# Patient Record
Sex: FEMALE
[De-identification: names, ages, dates, MRNs, and addresses within clinical notes are randomized per-mention and may not be internally consistent; named-entity substitution may affect disease eponyms.]

---

## 2017-05-24 ENCOUNTER — HOSPITAL ENCOUNTER (OUTPATIENT)
Dept: HOSPITAL 53 - M LAB REF | Age: 77
End: 2017-05-24
Attending: NURSE PRACTITIONER
Payer: MEDICARE

## 2017-05-24 DIAGNOSIS — R32: Primary | ICD-10-CM

## 2017-06-20 ENCOUNTER — HOSPITAL ENCOUNTER (OUTPATIENT)
Dept: HOSPITAL 53 - M LAB REF | Age: 77
End: 2017-06-20
Attending: NURSE PRACTITIONER
Payer: MEDICARE

## 2017-06-20 DIAGNOSIS — R35.0: Primary | ICD-10-CM

## 2017-08-04 ENCOUNTER — HOSPITAL ENCOUNTER (OUTPATIENT)
Dept: HOSPITAL 53 - M LAB REF | Age: 77
End: 2017-08-04
Attending: NURSE PRACTITIONER
Payer: MEDICARE

## 2017-08-04 DIAGNOSIS — M25.569: Primary | ICD-10-CM

## 2020-03-30 ENCOUNTER — HOSPITAL ENCOUNTER (OUTPATIENT)
Dept: HOSPITAL 53 - M WUC | Age: 80
End: 2020-03-30
Attending: NURSE PRACTITIONER
Payer: MEDICARE
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
## 2020-03-30 DIAGNOSIS — S40.861A: Primary | ICD-10-CM

## 2020-04-02 LAB
B BURGDOR IGG+IGM SER-ACNC: <0.91 ISR (ref 0–0.9)
B BURGDOR IGM SER IA-ACNC: <0.8 INDEX (ref 0–0.79)

## 2020-12-14 ENCOUNTER — HOSPITAL ENCOUNTER (OUTPATIENT)
Dept: HOSPITAL 53 - M ED | Age: 80
Setting detail: OBSERVATION
LOS: 2 days | Discharge: HOME | End: 2020-12-16
Attending: INTERNAL MEDICINE | Admitting: FAMILY MEDICINE
Payer: MEDICARE

## 2020-12-14 VITALS — WEIGHT: 127.21 LBS | BODY MASS INDEX: 25.64 KG/M2 | HEIGHT: 59 IN

## 2020-12-14 DIAGNOSIS — I10: ICD-10-CM

## 2020-12-14 DIAGNOSIS — I16.0: Primary | ICD-10-CM

## 2020-12-14 DIAGNOSIS — I65.22: ICD-10-CM

## 2020-12-14 DIAGNOSIS — Z79.899: ICD-10-CM

## 2020-12-14 DIAGNOSIS — Z79.82: ICD-10-CM

## 2020-12-14 DIAGNOSIS — Z88.8: ICD-10-CM

## 2020-12-14 DIAGNOSIS — Z79.01: ICD-10-CM

## 2020-12-14 DIAGNOSIS — E78.49: ICD-10-CM

## 2020-12-14 DIAGNOSIS — Z87.891: ICD-10-CM

## 2020-12-14 DIAGNOSIS — Z91.013: ICD-10-CM

## 2020-12-14 PROCEDURE — 85730 THROMBOPLASTIN TIME PARTIAL: CPT

## 2020-12-14 PROCEDURE — 93005 ELECTROCARDIOGRAM TRACING: CPT

## 2020-12-14 PROCEDURE — 71045 X-RAY EXAM CHEST 1 VIEW: CPT

## 2020-12-14 PROCEDURE — 99285 EMERGENCY DEPT VISIT HI MDM: CPT

## 2020-12-14 PROCEDURE — 85027 COMPLETE CBC AUTOMATED: CPT

## 2020-12-14 PROCEDURE — 97165 OT EVAL LOW COMPLEX 30 MIN: CPT

## 2020-12-14 PROCEDURE — 86901 BLOOD TYPING SEROLOGIC RH(D): CPT

## 2020-12-14 PROCEDURE — 70450 CT HEAD/BRAIN W/O DYE: CPT

## 2020-12-14 PROCEDURE — 93880 EXTRACRANIAL BILAT STUDY: CPT

## 2020-12-14 PROCEDURE — 96374 THER/PROPH/DIAG INJ IV PUSH: CPT

## 2020-12-14 PROCEDURE — 80048 BASIC METABOLIC PNL TOTAL CA: CPT

## 2020-12-14 PROCEDURE — 85610 PROTHROMBIN TIME: CPT

## 2020-12-14 PROCEDURE — 97161 PT EVAL LOW COMPLEX 20 MIN: CPT

## 2020-12-14 PROCEDURE — 70551 MRI BRAIN STEM W/O DYE: CPT

## 2020-12-14 PROCEDURE — 84484 ASSAY OF TROPONIN QUANT: CPT

## 2020-12-14 PROCEDURE — 93306 TTE W/DOPPLER COMPLETE: CPT

## 2020-12-14 PROCEDURE — 93041 RHYTHM ECG TRACING: CPT

## 2020-12-14 PROCEDURE — 80061 LIPID PANEL: CPT

## 2020-12-14 PROCEDURE — 94760 N-INVAS EAR/PLS OXIMETRY 1: CPT

## 2020-12-14 PROCEDURE — 86850 RBC ANTIBODY SCREEN: CPT

## 2020-12-14 PROCEDURE — 82550 ASSAY OF CK (CPK): CPT

## 2020-12-14 PROCEDURE — 70544 MR ANGIOGRAPHY HEAD W/O DYE: CPT

## 2020-12-14 PROCEDURE — 36415 COLL VENOUS BLD VENIPUNCTURE: CPT

## 2020-12-14 PROCEDURE — 86900 BLOOD TYPING SEROLOGIC ABO: CPT

## 2020-12-14 PROCEDURE — 82553 CREATINE MB FRACTION: CPT

## 2020-12-14 PROCEDURE — 85025 COMPLETE CBC W/AUTO DIFF WBC: CPT

## 2020-12-14 PROCEDURE — 87631 RESP VIRUS 3-5 TARGETS: CPT

## 2020-12-14 PROCEDURE — 97116 GAIT TRAINING THERAPY: CPT

## 2020-12-15 VITALS — SYSTOLIC BLOOD PRESSURE: 140 MMHG | DIASTOLIC BLOOD PRESSURE: 80 MMHG

## 2020-12-15 VITALS — DIASTOLIC BLOOD PRESSURE: 70 MMHG | SYSTOLIC BLOOD PRESSURE: 157 MMHG

## 2020-12-15 VITALS — DIASTOLIC BLOOD PRESSURE: 75 MMHG | SYSTOLIC BLOOD PRESSURE: 162 MMHG

## 2020-12-15 VITALS — SYSTOLIC BLOOD PRESSURE: 155 MMHG | DIASTOLIC BLOOD PRESSURE: 69 MMHG

## 2020-12-15 LAB
APTT BLD: 29.2 SECONDS (ref 24.2–38.5)
BASOPHILS # BLD AUTO: 0.1 10^3/UL (ref 0–0.2)
BASOPHILS NFR BLD AUTO: 0.9 % (ref 0–1)
BUN SERPL-MCNC: 26 MG/DL (ref 7–18)
CALCIUM SERPL-MCNC: 9.3 MG/DL (ref 8.8–10.2)
CHLORIDE SERPL-SCNC: 108 MEQ/L (ref 98–107)
CHOLEST SERPL-MCNC: 238 MG/DL (ref ?–200)
CHOLEST/HDLC SERPL: 3.84 {RATIO} (ref ?–5)
CK MB CFR.DF SERPL CALC: 3.77
CK MB SERPL-MCNC: 2.3 NG/ML (ref ?–3.6)
CK SERPL-CCNC: 61 U/L (ref 26–192)
CO2 SERPL-SCNC: 29 MEQ/L (ref 21–32)
CREAT SERPL-MCNC: 1.2 MG/DL (ref 0.55–1.3)
EOSINOPHIL # BLD AUTO: 0.6 10^3/UL (ref 0–0.5)
EOSINOPHIL NFR BLD AUTO: 6.6 % (ref 0–3)
GFR SERPL CREATININE-BSD FRML MDRD: 45.9 ML/MIN/{1.73_M2} (ref 32–?)
GLUCOSE SERPL-MCNC: 130 MG/DL (ref 70–100)
HCT VFR BLD AUTO: 35.6 % (ref 36–47)
HDLC SERPL-MCNC: 62 MG/DL (ref 40–?)
HGB BLD-MCNC: 11.1 G/DL (ref 12–15.5)
INR PPP: 0.92
LDLC SERPL CALC-MCNC: 143 MG/DL (ref ?–100)
LYMPHOCYTES # BLD AUTO: 2.4 10^3/UL (ref 1.5–5)
LYMPHOCYTES NFR BLD AUTO: 28.3 % (ref 24–44)
MCH RBC QN AUTO: 29.2 PG (ref 27–33)
MCHC RBC AUTO-ENTMCNC: 31.2 G/DL (ref 32–36.5)
MCV RBC AUTO: 93.7 FL (ref 80–96)
MONOCYTES # BLD AUTO: 0.7 10^3/UL (ref 0–0.8)
MONOCYTES NFR BLD AUTO: 8.6 % (ref 0–5)
NEUTROPHILS # BLD AUTO: 4.7 10^3/UL (ref 1.5–8.5)
NEUTROPHILS NFR BLD AUTO: 55.4 % (ref 36–66)
NONHDLC SERPL-MCNC: 176 MG/DL
PLATELET # BLD AUTO: 334 10^3/UL (ref 150–450)
POTASSIUM SERPL-SCNC: 4.2 MEQ/L (ref 3.5–5.1)
PROTHROMBIN TIME: 12.6 SECONDS (ref 12.5–14.3)
RBC # BLD AUTO: 3.8 10^6/UL (ref 4–5.4)
RSV RNA NPH QL NAA+PROBE: NEGATIVE
SODIUM SERPL-SCNC: 141 MEQ/L (ref 136–145)
TRIGL SERPL-MCNC: 165 MG/DL (ref ?–150)
TROPONIN I SERPL-MCNC: < 0.02 NG/ML (ref ?–0.1)
WBC # BLD AUTO: 8.5 10^3/UL (ref 4–10)

## 2020-12-15 RX ADMIN — ATORVASTATIN CALCIUM SCH MG: 20 TABLET, FILM COATED ORAL at 04:30

## 2020-12-15 RX ADMIN — Medication SCH MG: at 09:00

## 2020-12-15 RX ADMIN — ATORVASTATIN CALCIUM SCH MG: 20 TABLET, FILM COATED ORAL at 20:45

## 2020-12-15 RX ADMIN — CLOPIDOGREL BISULFATE SCH MG: 75 TABLET ORAL at 09:26

## 2020-12-15 NOTE — REPVR
PROCEDURE INFORMATION: 

Exam: MR Head Without Contrast 

Exam date and time: 12/15/2020 3:38 PM 

Age: 80 years old 

Clinical indication: Altered mental status/memory loss; Confusion or 

disorientation; Patient HX: HX CVA , AMS that has since subsided; Additional 

info: TIA, HX of CVA 



TECHNIQUE: 

Imaging protocol: MR of the head without contrast. 



COMPARISON: 

CT Head without contrast 12/14/2020 11:37 PM 



FINDINGS: 

Brain: There are 2 foci of restricted diffusion, the 1st is in the body of the 

right caudate series 304, image 1 frame 22 and the 2nd is in the lateral body 

of the caudate/centrum semiovale series 304, image 1 frame 23. Increased signal 

intensity of the deep and subcortical white matter on the FLAIR and T2 weighted 

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 

infarctions in the basal ganglia largest in the thalamus on the right and in 

the body of the left caudate. No acute intracranial hemorrhage or prior 

microhemorrhages. There is a normal variant cavum septum pellucidum et vergae. 

Cerebral ventricles: The ventricles appear mildly enlarged, but not out of 

proportion to the degree of parenchymal volume loss.

Bones/joints: Unremarkable. 

Paranasal sinuses: No acute sinusitis. 

Mastoid air cells: No mastoid effusion. 

Orbits: Unremarkable. 

Soft tissues: Unremarkable. 



IMPRESSION: 

There are 2 foci of acute infarction within the right caudate nucleus.



Electronically signed by: Keisha Melendez On 12/15/2020  16:08:08 PM

## 2020-12-15 NOTE — HPEPDOC
General


Date of Admission





Date of Service:  Dec 15, 2020


Primary Care Physician:  MICK KEENE DO


Attending Physician:  Jordy Martell MD


Chief Complaint


The patient is a 80-year-old female admitted with a reason for visit of "my 

speech was very slurred."


Source:  Patient, RN/MD


Exam Limitations:  No limitations





History of Present Illness


Ms. Gutiérrez was at home with her family having supper when suddenly she had 

difficulty speaking and couldn't lift her left arm. The symptoms lasted only 2 

or 3 minutes and initially the patient and her family were sure to do. They 

waited a little while to bring her in for evaluation, but eventually decided 

evaluation in the hospital even though her symptoms had resolved.





For now she is under a lot of stress right now. Her   two evenings 

ago while under the care of Hospice.





Home Medications


Scheduled


Amlodipine Besylate (Amlodipine Besylate) 5 Mg Tablet, 5 MG PO DAILY, (Reported)


Bimatoprost (Lumigan) 0.01% 2.5ML Drops, 1 DROP OU QHS, (Reported)


Calcium Carbonate/Vitamin D3 (Calcium 500-Vit D3 200 Caplet) 1 Each Tablet, 1 

TAB PO DAILY, (Reported)


Cholecalciferol (Vitamin D3) (Vitamin D3) 50 Mcg Tablet, 50 MCG PO DAILY, 

(Reported)


Vitamin B Complex (B Complex) 1 Each Tablet, 1 TAB PO DAILY, (Reported)





Allergies


Coded Allergies:  


     hydrochlorothiazide (Verified  Allergy, Intermediate, 20)


   


   RASH


     shellfish derived (Verified  Allergy, Intermediate, 20)


   


   RASH


Uncoded Allergies:  


     BONE FISH (Allergy, Intermediate, 20)


   


   RASH





Past Medical History


Medical History


Hypertension


Hyperlipidemia


Osteoarthritis


Macular degeneration of the right eye


Surgical History


Left total hip arthroplasty


Right total knee arthroplasty





Family History


Her father  at the age of 78 of prostate cancer


Her mother  at the age of 90 of Alzheimer's disease and osteoporosis





Social History


* Smoker:  former Smoker (quit smoking in the 1970s)


Alcohol:  other (she reports she drinks 2-3 beers each night)


Drugs:  denies (but she does have CBD oil in her coffee most every day)


She lives in a separate house on her son's property





A-FIB/CHADSVASC


A-FIB History


Current/History of A-Fib/PAF?:  No


Current PO Anticoag Therapy:  No





Review of Systems


Constitutional:  Denies: Chills, Fever


Eyes:  Denies: Vision change


ENT:  Denies: Head Aches, Dysphagia


Pulmonary:  Denies: Dyspnea, Cough


Cardiovascular:  Denies: Chest Pain, Palpitations, Lt Headedness


Gastrointestinal:  Denies: Nausea, Vomiting, Abdominal Pain


Genitourinary:  Denies: Dysuria, Hematuria


Hematologic:  Denies: Bruising, Bleeding Excessively


Endocrine:  Denies: Heat Intolerance, Cold Intolerance


Neurological:  Reports: Weakness, Change in speech; 


   Denies: Numbness, Confusion, Seizures


Psych:  Reports: Mood Normal, Depression; 


   Denies: Memory Issues





Physical Examination


General Exam:  Positive: Alert, Cooperative, No Acute Distress


Eye Exam:  Positive: PERRLA, Conjunctiva & lids normal; 


   Negative: Sclera icteric


ENT Exam:  Positive: Atraumatic, Mucous membr. moist/pink, Pharynx Normal, 

Tongue Midline


Neck Exam:  Positive: Supple; 


   Negative: Lymphadenopathy


Chest Exam:  Positive: Clear to auscultation, Normal air movement


Heart Exam:  Positive: Rate Normal, Regular Rhythm, Normal S1, Normal S2, 

Murmurs (there was a mid systolic murmur noted at the left lower sternal 

border); 


   Negative: Rubs


Telemetry:  Positive: No significant arrhythmia


Abdomen Exam:  Positive: Normal bowel sounds, Soft; 


   Negative: Tenderness, Hepatospenomegaly


Extremity Exam:  Positive: Normal pulses; 


   Negative: Edema


Skin Exam:  Positive: Nl turgor and temperature; 


   Negative: Rash


Neuro Exam:  Positive: Normal Speech, Strength at 5/5 X4 ext, Normal Tone, 

Sensation Intact, Cranial Nerves 3-12 NL


Psych Exam:  Positive: Mental status NL, Mood NL, Oriented x 3





Vital Signs





Vital Signs








  Date Time  Temp Pulse Resp B/P (MAP) Pulse Ox O2 Delivery O2 Flow Rate FiO2


 


12/15/20 03:45  65 18 179/84 (115) 96 Room Air  


 


20 23:24 97.8       











Laboratory Data


Labs 24H


Laboratory Tests 2


20 23:52: Bedside Glucose (Misc Panel) 127H


12/15/20 00:08: 


Immature Granulocyte % (Auto) 0.2, Neutrophils (%) (Auto) 55.4, Lymphocytes (%) 

(Auto) 28.3, Monocytes (%) (Auto) 8.6H, Eosinophils (%) (Auto) 6.6H, Basophils 

(%) (Auto) 0.9, Neutrophils # (Auto) 4.7, Lymphocytes # (Auto) 2.4, Monocytes # 

(Auto) 0.7, Eosinophils # (Auto) 0.6H, Basophils # (Auto) 0.1, Nucleated Red 

Blood Cells % (auto) 0.0, Prothrombin Time 12.6, Prothromb Time International 

Ratio 0.92, Activated Partial Thromboplast Time 29.2, Anion Gap 4L, Glomerular 

Filtration Rate 45.9, Calcium Level 9.3, Total Creatine Kinase 61, Creatine 

Kinase MB 2.3, Creatine Kinase MB Relative Index 3.77, Troponin I < 0.02


12/15/20 00:17: 


Coronavirus (COVID-19)(PCR) NEGATIVE, Influenza Type A (RT-PCR) NEGATIVE, 

Influenza Type B (RT-PCR) NEGATIVE, Respiratory Syncytial Virus (PCR) NEGATIVE


CBC/BMP


Laboratory Tests


12/15/20 00:08











Problems





(1) TIA (transient ischemic attack)


Status:  Resolved


Problem Specific Plan:  Monitor Clinically


Problem Text:  Her symptoms had entirely resolved before she came to the 

hospital. Her CT scan does show chronic lacunar infarcts. In this setting, I do 

think she merits further evaluation in the acute care setting. Will monitor on 

telemetry, do neuro checks, get an echo (especially because she has a possibly 

new murmur), and get carotid Dopplers (in light of the lacunar infarcts). Did 

start her on Plavix, and atorvastatin.





(2) Hypertension


Status:  Chronic


Problem Specific Plan:  Monitor Clinically


Problem Text:  Her blood pressure is running high while in the emergency 

department. The systolic blood pressure was fairly consistently in the 180s. 

This might represent compensatory hypertension if there is a small area of her 

brain that is ischemic and needs additional pressure so as not to worsen. 

However, I do not want to leave her blood pressure to uncontrolled either. I 

gave her a one-time dose of labetalol and will monitor. I did continue her 

routine medications.





(3) Hyperlipidemia


Problem Text:  I started her on atorvastatin based on her presentation with a 

TIA. I ordered labs for the morning. 








Plan / VTE


VTE Prophylaxis Ordered?:  Yes





Plan


Advanced Directives:  MOLST Form is available (I filled a most form out with her

in the emergency department today.), Health Care Proxy (HCP)











Jordy Martell MD             Dec 15, 2020 04:21

## 2020-12-15 NOTE — REPVR
PROCEDURE INFORMATION: 

Exam: US Duplex Bilateral Extracranial Arteries 

Exam date and time: 12/15/2020 5:01 AM 

Age: 80 years old 

Clinical indication: Speech disturbance; Slurred speech; Additional info: TIA, 

lacunar infarcts 



TECHNIQUE: 

Imaging protocol: Real-time Duplex ultrasound scan of the bilateral carotid and 

vertebral arteries combining gray scale, color Doppler and spectral waveform 

analysis. Bilateral exam. 



COMPARISON: 

CT Head without contrast 12/14/2020 11:37 PM 



FINDINGS: 

Right common carotid artery: Mild intimal thickening with mild mural soft and 

hard plaques seen in the right CCA extending to the carotid bulb and proximal 

ICA. Normal waveform seen in the right CCA with peak systolic velocities of 

80.1 centimeter/second, 65.2 centimeter/second and 52.8 centimeter/second in 

the proximal, mid and distal CCA respectively. 

Right internal carotid artery: Normal waveform seen in the right carotid bulb 

with peak systolic velocity of 56.5 centimeter/second. Normal waveform seen in 

the right ICA with peak systolic velocities of 71.1 centimeter/second, 59.4 

centimeter/second, and 67.8 centimeter/second in the proximal, mid and distal 

ICA respectively. 

Right ICA/CCA ratio: Right ICA to CCA ratio is within normal limits at 0.89. 

Right external carotid artery: Normal waveform with peak systolic velocity of 

53.5 centimeter/second seen in the proximal right ECA. 

Right vertebral artery: Antegrade flow seen in the right vertebral artery with 

peak systolic velocity of 52.8 centimeter/second. 



Left common carotid artery: Diffuse intimal thickening seen throughout the left 

CCA with peak systolic velocities 55.3 centimeter/second, 60.3 

centimeter/second and 59.6 centimeter/second in the proximal, mid and distal 

CCA respectively. 

Left internal carotid artery: Bulky calcified plaques seen in the left carotid 

bulb extending to the ICA. Peak systolic velocity of 67.7 centimeter/second 

seen in the left carotid bulb. There is turbulent flow with increased 

velocities in the proximal left ICA with peak systolic velocities 755 

centimeter/second and end-diastolic velocity of 423 centimeter/second. Peak 

systolic velocity of 38.2 centimeter/second seen in the mid left ICA. Peak 

systolic velocity of 38.5 centimeter/second seen in the distal left ICA. 

Left ICA/CCA ratio: Left ICA to CCA ratio is significantly increased at 12.52. 

Left external carotid artery: Peak systolic velocity of 83.8 centimeter/second 

seen in the proximal left ECA. 

Left vertebral artery: Antegrade flow seen in the left vertebral artery with 

peak systolic velocity of 51.4 centimeter/second. 



IMPRESSION: 

Bulky calcified plaques in the left carotid bulb extending to the proximal left 

ICA resulting in more than 70% (but less than near occlusion) stenosis. 



REFERENCES: 

SRU CRITERIA. The degree of internal carotid artery stenosis is based on 

criteria defined by the Society of Radiologists in Ultrasound (SRU). Normal is 

no stenosis. Mild is less than 50% stenosis. Moderate is 50-69% stenosis. 

Severe is greater than 69% stenosis to near occlusion. Near occlusion is a 

markedly narrowed lumen. Total occlusion is no detectable patent lumen. 



Electronically signed by: Alban Porter On 12/15/2020  05:22:55 AM

## 2020-12-15 NOTE — REPVR
PROCEDURE INFORMATION: 

Exam: MR Angiogram Head Without Contrast, Arteries 

Exam date and time: 12/15/2020 3:38 PM 

Age: 80 years old 

Clinical indication: Cognitive deficit; Altered mental status; Patient HX: HX 

CVA , AMS that has since subsided; Additional info: TIA, HX of CVA 



TECHNIQUE: 

Imaging protocol: MR angiogram head without contrast. Exam focused on the 

arteries. 

3D rendering (Not supervised by radiologist): MIP and/or 3D reconstructed 

images were created by the technologist. 



COMPARISON: 

CT Head without contrast 12/14/2020 11:37 PM 



FINDINGS: 



ANTERIOR CIRCULATION: 

Right internal carotid artery: Intracranial segment is patent with no 

significant stenosis. No aneurysm. 

Right middle cerebral artery: There is focal stenosis in the mid to distal 

right M1 segment measuring approximately 3.5 mm in length. Series 203, image 1 

frames 9 -12. 

Right anterior cerebral artery: No occlusion or significant stenosis. No 

aneurysm. 



Left internal carotid artery: Intracranial segment is patent with no 

significant stenosis. No aneurysm. 

Left middle cerebral artery: No occlusion or significant stenosis. No aneurysm. 

Left anterior cerebral artery: Very little flow is seen in the left A1 segment 

which may be due to congenital hypoplasia versus stenosis. 



POSTERIOR CIRCULATION: 

Right vertebral artery: No occlusion or significant stenosis. No aneurysm. 

Left vertebral artery: No occlusion or significant stenosis. No aneurysm. 

Basilar artery: No occlusion or significant stenosis. No aneurysm. 

Right posterior cerebral artery: There may be focal areas of stenosis at the 

junction of the right P2 and P3 segments as well as the right P3 and P4 

segments of the right posterior cerebral artery. 

Left posterior cerebral artery: No occlusion or significant stenosis. No 

aneurysm. 



IMPRESSION: 

1. No definite acute occlusion although there is stenosis versus hypoplasia of 

the left A1 segment.

2. Focal stenoses are seen in the right posterior cerebral artery as above.  



Electronically signed by: Keisha Melendez On 12/15/2020  15:56:58 PM

## 2020-12-15 NOTE — REPVR
PROCEDURE INFORMATION: 

Exam: XR Chest, 1 View 

Exam date and time: 12/14/2020 11:50 PM 

Age: 80 years old 

Clinical indication: CVA 



TECHNIQUE: 

Imaging protocol: XR of the chest 

Views: 1 view. 



COMPARISON: 

No relevant prior studies available. 



FINDINGS: 

Lungs: Unremarkable. No consolidation. No pulmonary edema. 

Pleural space: Unremarkable. No pleural effusion or pneumothorax is identified. 

Heart/Mediastinum: Unremarkable. No cardiomegaly. 

Vasculature: There are atherosclerotic calcifications of the aortic arch. 

Bones/joints: There are endplate spurs in the thoracic spine. There is a mild 

levoscoliosis of the lumbar spine. 



IMPRESSION: 

No acute findings. 



Electronically signed by: Kareem Logan On 12/15/2020  01:38:49 AM

## 2020-12-15 NOTE — ECGEPIP
[facility] - ED

                                       

                                       Test Date:    2020-12-15

Pat Name:     LIZETTE CROW         Department:   

Patient ID:   [id number]                 Room:         [place] [address]

Gender:       Female                   Technician:   HELENA

:          1940               Requested By: SANIA BANERJEE

Order Number: [id number]     Reading MD:   Larry Borrego

                                 Measurements

Intervals                              Axis          

Rate:         85                       P:            40

VT:           154                      QRS:          -15

QRSD:         99                       T:            37

QT:           393                                    

QTc:          469                                    

                           Interpretive Statements

SINUS RHYTHM WITH FREQUENT SUPRAVENTRICULAR PREMATURE COMPLEXES

INCOMPLETE RIGHT BUNDLE BRANCH BLOCK

MINIMAL VOLTAGE CRITERIA FOR LVH, CONSIDER NORMAL VARIANT

NO PRIORS FOR COMPARISON

Electronically Signed on 12- 7:51:29 EST by Larry Borrego

## 2020-12-15 NOTE — REPVR
PROCEDURE INFORMATION: 

Exam: CT Head Without Contrast 

Exam date and time: 12/14/2020 11:33 PM 

Age: 80 years old 

Clinical indication: CVA 



TECHNIQUE: 

Imaging protocol: Computed tomography of the head without contrast. 

Radiation optimization: All CT scans at this facility use at least one of these 

dose optimization techniques: automated exposure control; mA and/or kV 

adjustment per patient size (includes targeted exams where dose is matched to 

clinical indication); or iterative reconstruction. 



COMPARISON: 

No relevant prior studies available. 



FINDINGS: 

Brain: There is no CT evidence for an acute large vessel territorial infarct. 

No acute intracranial hemorrhage is seen. There are non-specific foci of low 

attenuation in the periventricular and subcortical white matter, which are 

likely the sequela of chronic small vessel ischemic injury. There are chronic 

lacunar infarcts involving the head of the right caudate nucleus and body of 

the left caudate nucleus. No mass effect, midline shift, or herniation is 

noted. 

Cerebral ventricles: The ventricles are mildly dilated in proportion to the 

sulci, which is compatible with mild generalized cerebral volume loss. 

Incidental note is made of a normal variant cavum septum pellucidum and cavum 

vergae, which is persistence of the embryological fluid-filled space between 

the leaflets of the septum pellucidum. 

Bones/joints: The skull is intact. No suspicious osteolytic or osteoblastic 

lesion. 

Paranasal sinuses: The imaged portions of the sinuses are well-aerated. No 

air-fluid levels are noted in the sinuses. 

Mastoid air cells: Clear. 

Vasculature: There are atherosclerotic calcifications of the intracranial 

portion of the left vertebral artery and both internal carotid arteries. 

Soft tissues: Unremarkable. No soft tissue fluid collection. 



IMPRESSION: 

1. No acute intracranial abnormality. 

2. Chronic lacunar infarcts involving the head of the right caudate nucleus and 

body of the left caudate nucleus. 

3. Mild cerebral atrophy and chronic microangiopathic changes. 



Electronically signed by: Kareem Logan On 12/15/2020  00:10:16 AM

## 2020-12-15 NOTE — IPNPDOC
Date Seen


The patient was seen on 12/15/20.





Progress Note


SUBJECTIVE:


No focal deficits on exam this AM. MRI/MRA brain done today- 2 foci of acute 

infarction noted within right caudate nucleus. Started on ASA in addition to 

existing therapy. PT/OT, echo to be done t





OBJECTIVE: 





VITAL SIGNS: 


Please see below





PHYSICAL EXAMINATION: 


CONSTITUTIONAL: No acute distress, resting comfortably, AAO x 3


EYES: PERRLA, EOM intact


HENT, MOUTH: Normocephalic, atraumatic, moist mucous membranes


NECK: SUPPLE, no JVD, no lymphadenopathy, no carotid bruit


CV: Regular rate and rhythm, S1S2 normal, no murmurs/rubs/gallops


RESPIRATORY: Clear to auscultation bilaterally, no rales/rhonchi/wheezes


GI:  BS positive in 4 quadrants, soft, nontender, nondistended, no rebound or 

guarding, no organomegaly


: Deferred


MUSCULOSKELETAL: Normal ROM. No cyanosis, clubbing, swelling, joint deformity, 

extremity edema


INTEGUMENTARY:  Intact, no rashes, no lesions, no erythema


NEUROLOGIC: Cranial Nerves II-XII are intact, no focal deficits


PSYCHIATRIC: Mood and affect are normal





CURRENT MEDICATIONS: Please see below 





LABORATORY DATA: Please see below 





IMAGING: 


MRI brain: 


There are 2 foci of acute infarction within the right caudate nucleus.





MRA brain: 


1. No definite acute occlusion although there is stenosis versus hypoplasia of 

the left A1 segment.


2. Focal stenoses are seen in the right posterior cerebral artery as described 

in report 





Carotid US: 


Bulky calcified plaques in the left carotid bulb extending to the proximal left 

ICA resulting in more than 70% (but less than near occlusion) stenosis. 





ASSESSMENT:81 y/o F with PMH of HTN, HLD admitted for treatment/workup of acute 

CVA  





PLAN: 





#CVA, acute 


-MRI/MRA above 


-Started on ASA in addition to Plavix, statin 


-Discussed with Dr. Askew, on-call neurologist 


-Will need o/p referral to vascular for carotid stenosis, vasculopathy. 


-PT/OT, echo pending 





#Hypertensive emergency- resolved 


-Existing HTN which is chronic 


-BP blanca rcontrolled today 


-C/w CCB, allowing permissive HTN until 12/16/20 





#HLD


-C/w statin 





DISPOSITION: C/w w/u above. Plan is discharge home when medically cleared with 

f/u with PCP and vascular surgery.





VS, I&O, 24H, Fishbone


Vital Signs/I&O





Vital Signs








  Date Time  Temp Pulse Resp B/P (MAP) Pulse Ox O2 Delivery O2 Flow Rate FiO2


 


12/15/20 14:00 99.8 76 16 155/69 (97) 98 Room Air  














I&O- Last 24 Hours up to 6 AM 


 


 12/15/20





 06:00


 


Intake Total 0 ml


 


Output Total 0 ml


 


Balance 0 ml











Laboratory Data


24H LABS


Laboratory Tests 2


12/14/20 23:52: Bedside Glucose (Misc Panel) 127H


12/15/20 00:08: 


Immature Granulocyte % (Auto) 0.2, Neutrophils (%) (Auto) 55.4, Lymphocytes (%) 

(Auto) 28.3, Monocytes (%) (Auto) 8.6H, Eosinophils (%) (Auto) 6.6H, Basophils 

(%) (Auto) 0.9, Neutrophils # (Auto) 4.7, Lymphocytes # (Auto) 2.4, Monocytes # 

(Auto) 0.7, Eosinophils # (Auto) 0.6H, Basophils # (Auto) 0.1, Nucleated Red 

Blood Cells % (auto) 0.0, Prothrombin Time 12.6, Prothromb Time International 

Ratio 0.92, Activated Partial Thromboplast Time 29.2, Anion Gap 4L, Glomerular 

Filtration Rate 45.9, Calcium Level 9.3, Total Creatine Kinase 61, Creatine 

Kinase MB 2.3, Creatine Kinase MB Relative Index 3.77, Troponin I < 0.02, 

Triglycerides Level 165H, Total Cholesterol 238H, LDL Cholesterol 143H, Non-HDL 

Cholesterol (LDL + VLDL) 176, Total HDL Cholesterol 62, Cholesterol/HDL Ratio 

3.838


12/15/20 00:17: 


Coronavirus (COVID-19)(PCR) NEGATIVE, Influenza Type A (RT-PCR) NEGATIVE, 

Influenza Type B (RT-PCR) NEGATIVE, Respiratory Syncytial Virus (PCR) NEGATIVE


CBC/BMP


Laboratory Tests


12/15/20 00:08











Current Medications





Current Medications








 Medications


  (Trade)  Dose


 Ordered  Sig/Alex


 Route


 PRN Reason  Start Time


 Stop Time Status Last Admin


Dose Admin


 


 Amlodipine


 Besylate


  (Norvasc)  5 mg  DAILY


 PO


   12/15/20 09:00


    12/15/20 09:28





 


 Atorvastatin


 Calcium


  (Lipitor)  20 mg  QHS


 PO


   12/14/20 21:00


    12/15/20 04:30





 


 Calcium/Vitamin D


  (Oscal D)  1 mg  DAILY


 PO


   12/15/20 09:00


     





 


 Clopidogrel


 Bisulfate


  (PLAVix)  75 mg  DAILY


 PO


   12/15/20 09:00


    12/15/20 09:26





 


 Home Med


  (Med Rec


 Complete!)    ASDIRECTED


 XX


   12/15/20 00:30


 12/15/20 00:21 DC  














Allergies


Coded Allergies:  


     fish derived (Verified  Allergy, Intermediate, BONE FISH- RASH, 12/15/20)


     hydrochlorothiazide (Verified  Allergy, Intermediate, RASH, 12/15/20)


     shellfish derived (Verified  Allergy, Intermediate, RASH, 12/15/20)











Lien Simmons MD            Dec 15, 2020 17:18

## 2020-12-16 VITALS — SYSTOLIC BLOOD PRESSURE: 168 MMHG | DIASTOLIC BLOOD PRESSURE: 86 MMHG

## 2020-12-16 VITALS — SYSTOLIC BLOOD PRESSURE: 166 MMHG | DIASTOLIC BLOOD PRESSURE: 77 MMHG

## 2020-12-16 VITALS — DIASTOLIC BLOOD PRESSURE: 74 MMHG | SYSTOLIC BLOOD PRESSURE: 144 MMHG

## 2020-12-16 LAB
BUN SERPL-MCNC: 19 MG/DL (ref 7–18)
CALCIUM SERPL-MCNC: 9.4 MG/DL (ref 8.8–10.2)
CHLORIDE SERPL-SCNC: 106 MEQ/L (ref 98–107)
CO2 SERPL-SCNC: 30 MEQ/L (ref 21–32)
CREAT SERPL-MCNC: 0.82 MG/DL (ref 0.55–1.3)
GFR SERPL CREATININE-BSD FRML MDRD: > 60 ML/MIN/{1.73_M2} (ref 32–?)
GLUCOSE SERPL-MCNC: 107 MG/DL (ref 70–100)
HCT VFR BLD AUTO: 39.3 % (ref 36–47)
HGB BLD-MCNC: 12.5 G/DL (ref 12–15.5)
MCH RBC QN AUTO: 30 PG (ref 27–33)
MCHC RBC AUTO-ENTMCNC: 31.8 G/DL (ref 32–36.5)
MCV RBC AUTO: 94.5 FL (ref 80–96)
PLATELET # BLD AUTO: 368 10^3/UL (ref 150–450)
POTASSIUM SERPL-SCNC: 4 MEQ/L (ref 3.5–5.1)
RBC # BLD AUTO: 4.16 10^6/UL (ref 4–5.4)
SODIUM SERPL-SCNC: 138 MEQ/L (ref 136–145)
WBC # BLD AUTO: 8.5 10^3/UL (ref 4–10)

## 2020-12-16 RX ADMIN — CLOPIDOGREL BISULFATE SCH MG: 75 TABLET ORAL at 11:02

## 2020-12-16 RX ADMIN — Medication SCH MG: at 11:02

## 2020-12-16 NOTE — DS.PDOC
Discharge Summary


General


Date of Admission


Dec 14, 2020 at 23:22


Date of Discharge


20


Attending Physician:  Lien Simmons MD





Discharge Summary


HPI: 


Ms. Gutiérrez is an 81 y/o F with PMH Of HTN, HLD, OA who was at home with her 

family having supper when suddenly she had difficulty speaking and couldn't lift

her left arm. The symptoms lasted only 2 or 3 minutes and initially the patient 

and her family wasn't sure what to do. They waited a little while to bring her 

in for evaluation, but eventually decided evaluation in the hospital even though

her symptoms had resolved. For now she is under a lot of stress right now. In 

the ER, CT head neg. BP systolic >200 but later improved on its own. All labs 

were unremarkable. Her   two evenings ago while under the care of 

Hospice so she has been  under a large amount of stress at home. Patient was 

admitted for TIA vs. CVA w/u. 





HOSPITAL COURSE: 


Neurological checks remains wnl, no focal deficits were seen. MRI brain done 

next day showed 2 foci of acute infarction noted within right caudate nucleus. 


US carotid artery showed >70% stenosis of left ICA. Swallowing evaluation was 

wnl. PT/OT cleared patient to return home without need for continued services. 

Case was discussed with neurology on call who recommended f/u with vascular 

surgery as o/p, continuation of ASA, plavix and statin on discharge. BP control 

is imperative and patient's amlodipine was increased to 10 mg PO daily. She will

need close f/u with PCP to ensure her BP remains within goal range for her age. 

At time of discharge, patient denied chest pain, shortness of breath, 

lightheadedness, fevers, chills, headache, blurry vision. 





PMH: 


Hypertension


Hyperlipidemia


Osteoarthritis


Macular degeneration of the right eye





PSH: 


Left total hip arthroplasty


Right total knee arthroplasty





Family History:


Her father  at the age of 78 of prostate cancer


Her mother  at the age of 90 of Alzheimer's disease and osteoporosis





Social History


Smoker:  former Smoker (quit smoking in the 1970s)


Alcohol:  other (she reports she drinks 2-3 beers each night)


Drugs:  denies (but she does have CBD oil in her coffee most every day)


She lives in a separate house on her son's property





ALLERGIES: 


Please see below. 





DISCHARGE MEDICATIONS: 


Please see below.





PHYSICAL EXAMINATION: 


VS: Please see below 


CONSTITUTIONAL: No acute distress, resting comfortably, AAO x 3


EYES: PERRLA, EOM intact


HENT, MOUTH: Normocephalic, atraumatic, moist mucous membranes


NECK: SUPPLE, no JVD, no lymphadenopathy, no carotid bruit


CV: Regular rate and rhythm, S1S2 normal, no murmurs/rubs/gallops


RESPIRATORY: Clear to auscultation bilaterally, no rales/rhonchi/wheezes


GI:  BS positive in 4 quadrants, soft, nontender, nondistended, no rebound or 

guarding, no organomegaly


: Deferred


MUSCULOSKELETAL: Normal ROM. No cyanosis, clubbing, swelling, joint deformity, 

extremity edema


INTEGUMENTARY:  Intact, no rashes, no lesions, no erythema


NEUROLOGIC: Cranial Nerves II-XII are intact, no focal deficits


PSYCHIATRIC: Mood and affect are normal





CURRENT MEDICATIONS: Please see below 





LABORATORY DATA: Please see below 





IMAGING: 


MRI brain: 


There are 2 foci of acute infarction within the right caudate nucleus.





MRA brain: 


1. No definite acute occlusion although there is stenosis versus hypoplasia of 

the left A1 segment.


2. Focal stenoses are seen in the right posterior cerebral artery as described 

in report 





Carotid US: 


Bulky calcified plaques in the left carotid bulb extending to the proximal left 

ICA resulting in more than 70% (but less than near occlusion) stenosis. 





ASSESSMENT:81 y/o F with PMH of HTN, HLD admitted for treatment/workup of acute 

CVA  





PLAN: 





#CVA of right caudate nucleus, acute 


-MRI/MRA above 


-lipid panel, elevated cholesterol


-Started on ASA, Plavix, statin 


-Discussed with Dr. Askew, on-call neurologist 


-Will need o/p referral to vascular for carotid stenosis, vasculopathy. 


-PT/OT-cleared to return home


-No swallowing issues 


-Echocardiogram is pending and may take several days to return. Would kindly ask

if PCP could follow up and review with patient on next visit.  





Carotid stenosis 


->70% stenosis of left ICA


-No vascular surgery coverage in hospital this week


-Patient will need o/p vascular surgery referral by PCP 


-C/w ASA, plavix and statin 





Hypertensive emergency- resolved 


-Existing HTN which is chronic 


-BP better rcontrolled today 


-C/w amlodipine 10 mg PO at discharge. Will need close f/u with PCP to ensure BP

is within goal range for age group. 


-Discussed importance of low salt diet





HLD


-C/w statin 


-Low fat diet 





DISPOSITION: Discharged home to f/u with PCP and will need o/p vascular surgery 

referral. 





TIME SPENT ON DISCHARGE: Greater than 30 minutes.





Vital Signs/I&Os





Vital Signs








  Date Time  Temp Pulse Resp B/P (MAP) Pulse Ox O2 Delivery O2 Flow Rate FiO2


 


20 14:00 98.6 62 17 144/74 (97) 97 Room Air  














I&O- Last 24 Hours up to 6 AM 


 


 20





 06:00


 


Intake Total 1170 ml


 


Output Total 1500 ml


 


Balance -330 ml











Laboratory Data


Labs 24H


Laboratory Tests 2


20 05:51: 


Nucleated Red Blood Cells % (auto) 0.0, Anion Gap 2L, Glomerular Filtration Rate

> 60.0, Calcium Level 9.4


CBC/BMP


Laboratory Tests


20 05:51











Discharge Medications


Scheduled


Amlodipine Besylate (Amlodipine Besylate) 5 Mg Tablet, 10 MG PO DAILY


Aspirin (Aspirin EC) 81 Mg Tablet.dr, 81 MG PO DAILY


Atorvastatin Calcium (Atorvastatin Calcium) 20 Mg Tablet, 20 MG PO QHS


Bimatoprost (Lumigan) 0.01% 2.5ML Drops, 1 DROP OU QHS, (Reported)


Calcium Carbonate/Vitamin D3 (Calcium 500-Vit D3 200 Caplet) 1 Each Tablet, 1 

TAB PO DAILY, (Reported)


Cholecalciferol (Vitamin D3) (Vitamin D3) 50 Mcg Tablet, 50 MCG PO DAILY, 

(Reported)


Clopidogrel Bisulfate (Clopidogrel) 75 Mg Tablet, 75 MG PO DAILY


Vitamin B Complex (B Complex) 1 Each Tablet, 1 TAB PO DAILY, (Reported)





Allergies


Coded Allergies:  


     fish derived (Verified  Allergy, Intermediate, BONE FISH- RASH, 12/15/20)


     hydrochlorothiazide (Verified  Allergy, Intermediate, RASH, 12/15/20)


     shellfish derived (Verified  Allergy, Intermediate, RASH, 12/15/20)











Lien Simmons MD            Dec 16, 2020 19:10

## 2020-12-17 NOTE — ECHO
DATE OF PROCEDURE: 12/16/2020



Age: 80 

Gender: Female 

Height: 59 inches 

Weight: 127 pounds 

Body surface area: 1.53 m2  



PATIENT LOCATION: Inpatient [facility] [place], Room 4210.



REFERRING PHYSICIAN: Jordy Martell M.D.  



INDICATION: TIA  cardiac source of embolic material ? 



MEASUREMENTS: 

2D Measurements:   

      RV  2.6 cm 

                   LV  4.0 cm 

      Septum 1.1 cm 

      Posterior wall 1.1 cm 

      Aortic Root 3.2 cm

      LA  3.6 cm 

      LVEF 65%



Doppler Measurements:

      AV  1.27 m/s

      LVOT  0.77 m/s

      LVOT diameter 1.8 cm

      MV-E 62, A 103, E/A ratio 0.6      

      Early mitral deceleration time 299 msec

      E prime medial 7, A prime medial 8.7, E prime lateral 7.8

      Average E/E prime ratio 8.4/PCWP  12.3 mmHg 

      PV  Not well seen 

      IVC 1.8 cm 



COMMENTS: 

Normal sinus rhythm without intraventricular conduction disturbance. Frequent 
isolated PACs. 



Technically challenging study in light of the patients body habitus, but 
diagnostically useful information was still obtained.



M-mode and two-dimensional echocardiography was performed with pulse, continuous
wave, color flow, and tissue Doppler studies.  

 

Normal left ventricular size, wall thickness, and wall motion.



Normal left atrial size with grade 1 LV diastolic dysfunction, but currently 
normal estimated mean left atrial pressure.



Normal right heart chamber sizes and motion, but unable to accurately estimate 
her pulmonary arterial pressure. We attempted to estimate this by using her 
tricuspid regurgitation, but the signal was inadequate. Her pulmonary valve and 
pulmonary trunk could not be interrogated adequately to obtain a pulmonary 
artery acceleration time. 



Normal IVC size and collapse against an elevated central venous pressure.



Normal aortic dimensions. 



Moderate aortic valvular sclerosis without stenosis or apparent insufficiency. 



Moderate degenerative changes of the mitral valvular apparatus without apparent 
inflow tract obstruction and only trace mitral insufficiency. 



Normal appearing tricuspid valve with trace insufficiency. 



We could not visualize any intracardiac mass or vegetation; but if the cardiac 
source of embolic material is seriously suspect, we would recommend a 
transesophageal echocardiogram.



No pericardial effusion. 



MTDD

## 2021-01-13 NOTE — HPEPDOC
[facility] Medical History & Physical


Date of Admission


Jan 13, 2021


Date of Service:  Jan 13, 2021





History and Physical


Vascular surgery. Dr. Segura





HISTORY OF PRESENT ILLNESS: The patient is a 80-year-old female with recent TIA,

slurred speech and weakness of the left hand lasting a few minutes, found to 

have severe stenosis left ICA, greater than 90%. Evaluated by Dr. Segura 

1/7/21 with plan to proceed with left carotid endarterectomy.





PAST MEDICAL HISTORY:


Hypertension


Dyslipidemia


Carotid artery stenosis


CVA





PAST SURGICAL HISTORY:


Left hip replacement


Right knee replacement





SOCIAL HISTORY:


Former smoker, quit 1990





FAMILY HISTORY:


Hypertension, CAD





ALLERGIES: Please see below.





REVIEW OF SYSTEMS:


Const: Denies chills, fever, weight gain and weight loss.


Eyes: Denies new vision changes.


ENMT: Denies hearing loss. Denies congestion. Denies dysphagia.


CV: Reports high blood pressure and high cholesterol, but denies chest pain and 

palpitations.


Resp: Denies cough, hemoptysis and shortness of breath.


GI: Denies abdominal pain, constipation, diarrhea, nausea and vomiting.


Musculo: Denies myalgia, pain and trouble walking.


Skin: Denies skin cancer, rash and wound.


Neuro: Reports transient ischemic attack but denies focal deficit, headache and 

seizures.


Psych: Denies anxiety and depression.


Endocrine: Denies diabetes, hyperthyroidism and hypothyroidism.


Hema/Lymph: Denies anemia and excessive bruising.





HOME MEDICATIONS: Please see below. 





PHYSICAL EXAMINATION:


Exam:


Const: Appears medically stable. No signs of apparent distress present.


Head/Face: Normal on inspection.


ENMT: Tympanic membranes: intact. External nose WNL.


Neck: Supple, no right carotid bruit present, positive left carotid bruit.


Resp: No wheezing. Clear to auscultation bilaterally.


CV: Rate is regular. Rhythm is regular.


Abdomen: Bowel sounds are positive. Abdomen is soft, nontender, and 

nondistended.


Lymph: No palpable or visible regional lymphadenopathy.


Musculo:Gait steady, distal pulses 2+ DP/PT


Skin:No rashes or lesions


Neuro:Alert and oriented x3, moves all extremities equally, no focal neurologic 

deficits noted.


Psych: Pleasant and cooperative





LABORATORY DATA: See below.





IMAGING: carotid duplex 12/15/20 right carotid system has fairly normal 

velocities with the internal carotid artery PSV/EDV of 71/31 and IC/CC ratio 

0.89, which is less than 50% stenosis, and her vertebral on the right is 

antegrade. On the left, the patient has greater than 90% stenosis. There is a 

focal very tight area of stenosis in the proximal left ICA, PSV/EDV is 755/423 

with an ICA/CC ratio of 12.32. The vertebral is antegrade.











ASSESSMENT/PLAN:


Severe left ICA stenosis with plan to proceed with left carotid endarterectomy 

as per Dr. Segura 1/27/21.


The procedure, risks, benefits and alternatives have been reviewed with the 

patient as per Dr. Segura. 


Informed consent is obtained and placed in the chart.


Transfusion consent is obtained and placed with the chart.


The patient is on aspirin, she will not hold aspirin for the procedure.


The patient is on Plavix 75 mg daily and will not hold Plavix for the procedure 

as per Dr. Segura.


Medical clearance is requested and will be placed with the chart.


Cardiac clearance is requested and will be placed with the chart.


NPO


Admission labs to include CBC, BMP, INR, PTT, type and screen.


IV fluids as per anesthesia.


Ancef 2 g IV preoperatively.


Continue statin.





Home Medications


Scheduled


Amlodipine Besylate (Amlodipine Besylate) 5 Mg Tablet, 10 MG PO DAILY


Aspirin (Aspirin EC) 81 Mg Tablet.dr 81 MG PO DAILY


Atorvastatin Calcium (Atorvastatin Calcium) 20 Mg Tablet, 20 MG PO QHS


Bimatoprost (Lumigan) 0.01% 2.5ML Drops, 1 DROP OU QHS


Calcium Carbonate/Vitamin D3 (Calcium 500-Vit D3 200 Caplet) 1 Each Tablet, 1 

TAB PO DAILY


Cholecalciferol (Vitamin D3) (Vitamin D3) 50 Mcg Tablet, 50 MCG PO DAILY


Clopidogrel Bisulfate (Clopidogrel) 75 Mg Tablet, 75 MG PO DAILY


Vitamin B Complex (B Complex) 1 Each Tablet, 1 TAB PO DAILY





Allergies


Coded Allergies:  


     fish derived (Verified  Allergy, Intermediate, BONE FISH- RASH, 12/15/20)


     hydrochlorothiazide (Verified  Allergy, Intermediate, RASH, 12/15/20)


     shellfish derived (Verified  Allergy, Intermediate, RASH, 12/15/20)





A-FIB/CHADSVASC


A-FIB History


Current/History of A-Fib/PAF?:  No











Nancy Berger              Jan 13, 2021 08:41

## 2021-01-22 ENCOUNTER — HOSPITAL ENCOUNTER (OUTPATIENT)
Dept: HOSPITAL 53 - M LABSMTC | Age: 81
End: 2021-01-22
Attending: ANESTHESIOLOGY
Payer: MEDICARE

## 2021-01-22 DIAGNOSIS — Z01.812: Primary | ICD-10-CM

## 2021-01-22 DIAGNOSIS — Z20.822: ICD-10-CM

## 2021-01-27 ENCOUNTER — HOSPITAL ENCOUNTER (INPATIENT)
Dept: HOSPITAL 53 - M OR | Age: 81
LOS: 2 days | Discharge: HOME | DRG: 39 | End: 2021-01-29
Attending: INTERNAL MEDICINE | Admitting: SURGERY
Payer: MEDICARE

## 2021-01-27 VITALS — DIASTOLIC BLOOD PRESSURE: 48 MMHG | SYSTOLIC BLOOD PRESSURE: 115 MMHG

## 2021-01-27 VITALS — DIASTOLIC BLOOD PRESSURE: 50 MMHG | SYSTOLIC BLOOD PRESSURE: 118 MMHG

## 2021-01-27 VITALS — SYSTOLIC BLOOD PRESSURE: 115 MMHG | DIASTOLIC BLOOD PRESSURE: 50 MMHG

## 2021-01-27 VITALS — SYSTOLIC BLOOD PRESSURE: 114 MMHG | DIASTOLIC BLOOD PRESSURE: 48 MMHG

## 2021-01-27 VITALS — DIASTOLIC BLOOD PRESSURE: 46 MMHG | SYSTOLIC BLOOD PRESSURE: 103 MMHG

## 2021-01-27 VITALS — SYSTOLIC BLOOD PRESSURE: 99 MMHG | DIASTOLIC BLOOD PRESSURE: 47 MMHG

## 2021-01-27 VITALS — SYSTOLIC BLOOD PRESSURE: 124 MMHG | DIASTOLIC BLOOD PRESSURE: 55 MMHG

## 2021-01-27 VITALS — SYSTOLIC BLOOD PRESSURE: 132 MMHG | DIASTOLIC BLOOD PRESSURE: 57 MMHG

## 2021-01-27 VITALS — DIASTOLIC BLOOD PRESSURE: 50 MMHG | SYSTOLIC BLOOD PRESSURE: 120 MMHG

## 2021-01-27 VITALS — HEIGHT: 59 IN | WEIGHT: 121.81 LBS | BODY MASS INDEX: 24.56 KG/M2

## 2021-01-27 VITALS — DIASTOLIC BLOOD PRESSURE: 49 MMHG | SYSTOLIC BLOOD PRESSURE: 111 MMHG

## 2021-01-27 DIAGNOSIS — D64.9: ICD-10-CM

## 2021-01-27 DIAGNOSIS — Z79.899: ICD-10-CM

## 2021-01-27 DIAGNOSIS — Z96.651: ICD-10-CM

## 2021-01-27 DIAGNOSIS — Z96.642: ICD-10-CM

## 2021-01-27 DIAGNOSIS — Z86.73: ICD-10-CM

## 2021-01-27 DIAGNOSIS — Z91.013: ICD-10-CM

## 2021-01-27 DIAGNOSIS — Z87.891: ICD-10-CM

## 2021-01-27 DIAGNOSIS — H35.30: ICD-10-CM

## 2021-01-27 DIAGNOSIS — Z79.82: ICD-10-CM

## 2021-01-27 DIAGNOSIS — I65.22: Primary | ICD-10-CM

## 2021-01-27 DIAGNOSIS — Z88.8: ICD-10-CM

## 2021-01-27 DIAGNOSIS — Z79.02: ICD-10-CM

## 2021-01-27 DIAGNOSIS — I10: ICD-10-CM

## 2021-01-27 DIAGNOSIS — E78.5: ICD-10-CM

## 2021-01-27 LAB
APTT BLD: 30.3 SECONDS (ref 24.2–38.5)
BUN SERPL-MCNC: 23 MG/DL (ref 7–18)
CALCIUM SERPL-MCNC: 9.8 MG/DL (ref 8.8–10.2)
CHLORIDE SERPL-SCNC: 105 MEQ/L (ref 98–107)
CO2 SERPL-SCNC: 30 MEQ/L (ref 21–32)
CREAT SERPL-MCNC: 0.92 MG/DL (ref 0.55–1.3)
GFR SERPL CREATININE-BSD FRML MDRD: > 60 ML/MIN/{1.73_M2} (ref 32–?)
GLUCOSE SERPL-MCNC: 101 MG/DL (ref 70–100)
HCT VFR BLD AUTO: 37.9 % (ref 36–47)
HGB BLD-MCNC: 12.1 G/DL (ref 12–15.5)
INR PPP: 0.98
MCH RBC QN AUTO: 29.8 PG (ref 27–33)
MCHC RBC AUTO-ENTMCNC: 31.9 G/DL (ref 32–36.5)
MCV RBC AUTO: 93.3 FL (ref 80–96)
PLATELET # BLD AUTO: 360 10^3/UL (ref 150–450)
POTASSIUM SERPL-SCNC: 3.7 MEQ/L (ref 3.5–5.1)
PROTHROMBIN TIME: 13.2 SECONDS (ref 12.5–14.3)
RBC # BLD AUTO: 4.06 10^6/UL (ref 4–5.4)
SODIUM SERPL-SCNC: 140 MEQ/L (ref 136–145)
WBC # BLD AUTO: 9.1 10^3/UL (ref 4–10)

## 2021-01-27 PROCEDURE — 03CL0ZZ EXTIRPATION OF MATTER FROM LEFT INTERNAL CAROTID ARTERY, OPEN APPROACH: ICD-10-PCS | Performed by: SURGERY

## 2021-01-27 PROCEDURE — 03UL0JZ SUPPLEMENT LEFT INTERNAL CAROTID ARTERY WITH SYNTHETIC SUBSTITUTE, OPEN APPROACH: ICD-10-PCS | Performed by: SURGERY

## 2021-01-27 PROCEDURE — 03CN0ZZ EXTIRPATION OF MATTER FROM LEFT EXTERNAL CAROTID ARTERY, OPEN APPROACH: ICD-10-PCS | Performed by: SURGERY

## 2021-01-27 PROCEDURE — 03CJ0ZZ EXTIRPATION OF MATTER FROM LEFT COMMON CAROTID ARTERY, OPEN APPROACH: ICD-10-PCS | Performed by: SURGERY

## 2021-01-27 RX ADMIN — HYDRALAZINE HYDROCHLORIDE PRN MG: 20 INJECTION INTRAMUSCULAR; INTRAVENOUS at 16:15

## 2021-01-27 NOTE — ROOPDOC
[facility] Report Of Operation


Report of Operation


DATE OF PROCEDURE: 1/27/21





PREPROCEDURE DIAGNOSES: Left carotid stenosis





POSTPROCEDURE DIAGNOSES: Same





PROCEDURE: Left carotid endarterectomy with xenosure patch angioplasty





SURGEON: Reinier Segura MD





ANESTHESIA: Gen. anesthesia and local





INDICATION FOR PROCEDURE: This is a very pleasant 80-year-old patient who had an

episode of left upper extremity weakness that prompted a TIA stroke workup, and 

part of that workup with a carotid duplex. The patient did not have significant 

carotid stenosis on the right, which we would expect if it was the source of a 

TIA or stroke for left-sided weakness, but she did in fact have near occlusive 

disease in the left ICA. She was started on aspirin and Plavix and statin 

appropriately. We saw her in clinic and discussed that although we don't think 

this left ICA stenosis is the etiology of her TIA/stroke symptoms, we do believe

that it is important to discuss a left carotid endarterectomy to prevent stroke 

on the left side. Risks benefits and alternatives to a left carotid 

endarterectomy were explained to the patient and her family. Everyone was 

thoroughly counseled and all questions were answered. Informed consent was 

obtained.





REPORT OF OPERATION: The patient was brought to the operating room in stable 

condition. General anesthesia and antibiotics were administered without 

complication. Her left neck and chest were prepped and draped in a sterile 

fashion. A timeout was performed. An oblique incision was made over the anterior

border of the sternocleidomastoid after anesthetizing with local anesthesia. 

This was carried down to the subcutaneous tissue in the platysma with Bovie 

cautery. We continued her dissection down to the jugular vein. The jugular vein 

in this patient is very medial. The carotid artery is not is medial. Therefore, 

we dissected along the lateral border of the jugular vein to mobilize it 

medially. We then dissected down to the carotid artery and circumferentially 

skeletonized the common carotid artery and a vessel loop was placed. We 

continued her dissection proximally and the hypoglossal nerve was identified and

carefully preserved. We skeletonized the external carotid artery and hypoglossal

artery and Vesseloops were placed. We then skeletonized the internal carotid 

artery and a vessel loop was placed distal to the palpable area of heavy plaque.

5000 units of heparin was given by anesthesia and allowed to circulate. We 

maintain the blood pressure during shunting at 140-160 mmHg. The Vesseloops were

secured and an arteriotomy was made and carried onto the internal carotid artery

beyond the plaque. We then placed an 8 [place] shunt into the internal 

carotid artery with good backbleeding in the vessel was resecured. The proximal 

end was placed in the common carotid artery and the Vesseloops with resecured. 

Flow was confirmed through the shunt with Doppler. We then endarterectomized the

common carotid artery, external carotid artery origin, and internal carotid 

artery proximally. A good endpoint was noted. This intima and debris was 

removed. We irrigated with heparinized saline and all additional occlusive 

intima was removed. Once we were satisfied with the endarterectomy, we 

anastomosed a xenosure patch to the arteriotomy in a running fashion with 5-0 

Prolene hemostatic suture. Before the final sutures are placed we irrigated with

heparinized saline, removed the shunt from the internal carotid artery and 

allowed backbleeding with a good flush with heparinized saline, then remove the 

shunt from the common carotid artery and flushed this and then allowed 

backbleeding from the external carotid artery as well. We allow the blood 

pressure to drift down less than 140 mmHg after the shunt was removed. We 

irrigated with heparinized saline and placed her final sutures. We then restart 

flow to the external carotid artery and the superior thyroid artery and then the

common carotid artery. After 10 beats of the hardware store flow to the internal

carotid artery. We then irrigated and used Surgicel and gentle pressure to gain 

hemostasis. Following this, required to repair stitches for minimal bleeding at 

the patch, and following this there was good hemostasis. We irrigated again with

saline. A FABIAN drain was placed and sutured at the skin with nylon. This was 

placed to bulb suction. The platysmal layer was approximated with a running 2-0 

Vicryl suture. The deep dermal layer was approximated with interrupted 4-0 

Vicryl suture. The skin was closed a running subcuticular 4-0 Monocryl suture. 

Mastisol and Steri-Strips were placed the length of the incision. A drain sponge

was placed around the drain. The patient was allowed to awaken from anesthesia 

in the OR and prior to extubation she was able to open her eyes not her head 

appropriately to questions move all 4 extremities equally. She was then 

extubated and taken to recovery in stable condition. She had a few episodes of 

hypertension postextubation which were treated with IV medication. With this, we

noted a little increased drain output each time which is not uncommon since the 

patient was maintained on full Plavix and aspirin. We will continue to monitor 

her FABIAN drain output. She has no hematoma on the left neck. She was taken to 

recovery in stable condition. In recovery, she was able to say her name and 

speak clearly, answer questions appropriately, vision and speech intact, moves 

all extremities equally with 5 out of 5 motor and sensory in the upper and lower

extremities, tongue is midline. So far, neuro exam is at baseline.





ESTIMATED BLOOD LOSS: Approximately 75 mL. 





COMPLICATIONS: None





PLAN: The patient will be admitted to the hospitalist service, ICU, overnight 

with plans for discharge in the morning if stable. We will have a goal systolic 

blood pressure of 90 mmHg to 140 mmHg. We will have a goal heart rate of 60-80. 

We will monitor the FABIAN drain output in the left neck for possible hematoma. 

Patient will be maintained on aspirin and Plavix. We will keep the head of bed 

elevated, okay for ice for comfort. Analgesia as needed. We will resume her home

diet. Tomorrow, we will plan to remove the FABIAN drain in the arterial line. If she

is able to eat, ambulate, urinate, blood pressure stable, FABIAN drain output 

minimal, she will be able to go home tomorrow.











REINIER SEGURA MD         Jan 27, 2021 15:48

## 2021-01-27 NOTE — HPEPDOC
[facility] Medical History & Physical


Date of Admission


2021


Date of Service:  2021


Attending Physician:  Lien Simmons MD





History and Physical


HPI: 


Patient is an 81 y/o F with PMH Of HTN, HLD, OA, left carotid stenosis, recent 

right caudate nucleus CVA who was was had elective left side carotid 

endarterectomy performed on 21 by Dr. Segura. Ms. Carrillo had a 

recent right caudate nucleus CVA and during that admission, had carotid US 

showing >70% stenosis of left  carotid artery. She was set up to follow up with 

vascular surgery, Dr. Segura, as o/p after discharge. Intraoperatively 

there were no complications. Minimal blood loss of 75 mL was recorded. 

Post-operatively she had two isolated episodes of hypertension for which she was

given hydralazine and labetalol IV. Goal blood pressure post-op is  mmHg 

systolic with HR 60-80 bpm. Medicine team was consulted to admit patient as 

primary post-operatively. 





Post-op patient only complained of some mild left neck pressure. She denied 

chest pain, shortness of breath, n/v, blurry vision, headache, arm numbnes

s/tingling, LH, dizziness, increased weakness. BP appeared within goal on my 

evaluation. FABIAN drain had sanguinous blood, total of 50 mL. According to nursing 

she has had approx 100 mL in addition to 75 mL of blood loss thus far. Per 

vascular request, ASA and statin, along with other home meds were resumed. 

Patient was admitted to ICU for close monitoring post-operatively with vascular 

surgery consulted to follow. 





PMH: 


Right caudate nucleus CVA


Left carotid stenosis 


Hypertension


Hyperlipidemia


Osteoarthritis


Macular degeneration of the right eye





PSH: 


Left carotid endarterectomy 21 


Left total hip arthroplasty


Right total knee arthroplasty





Family History:


Her father  at the age of 78 of prostate cancer


Her mother  at the age of 90 of Alzheimer's disease and osteoporosis





Social History


Smoker:  former Smoker (quit smoking in the 1970s)


Alcohol:  other (she reports she drinks 2-3 beers each night)


Drugs:  denies (but she does have CBD oil in her coffee most every day)


She lives in a separate house on her son's property





ALLERGIES: 


Please see below. 





DISCHARGE MEDICATIONS: 


Please see below.





PHYSICAL EXAMINATION: 


VS: HR 54 sinus rhythm, /41, RR 18, 100% on 3 L NC 


CONSTITUTIONAL: No acute distress, resting comfortably, AAO x 3


EYES: PERRLA, EOM intact


HENT, MOUTH: Normocephalic, atraumatic, moist mucous membranes


NECK: SUPPLE, no JVD, no lymphadenopathy, no carotid bruit,  FABIAN drain in left 

neck with 50 mL sanguinous fluid 


CV: Regular rate and rhythm, S1S2 normal, no murmurs/rubs/gallops


RESPIRATORY: Clear to auscultation bilaterally, no rales/rhonchi/wheezes


GI:  BS positive in 4 quadrants, soft, nontender, nondistended, no rebound or 

guarding, no organomegaly


: Deferred


MUSCULOSKELETAL: Normal ROM. No cyanosis, clubbing, swelling, joint deformity, 

extremity edema


INTEGUMENTARY:  Intact, no rashes, no lesions, no erythema


NEUROLOGIC: Cranial Nerves II-XII are intact, no focal deficits


PSYCHIATRIC: Mood and affect are normal





CURRENT MEDICATIONS: Please see below 





LABORATORY DATA: Please see below 





IMAGING: 





Carotid US 12/15/20: 


Bulky calcified plaques in the left carotid bulb extending to the proximal left 

ICA resulting in more than 70% (but less than near occlusion) stenosis. 





ASSESSMENT:  81 y/o F with PMH Of HTN, HLD, OA, left carotid stenosis, recent 

right caudate nucleus CVA who was was had elective left side carotid end

arterectomy performed on 21 by Dr. Segura, admitted to ICU for close 

monitoring post-operatively with vascular surgery consulted to follow. 





PLAN: 





#Left side carotid stenosis s/p left carotid endarterectomy POD 0


-Est blood loss thus far 175 mL, FABIAN drain in place


-Per vascular surgery, monitoring BP closely (goal BP  mmHg systolic with 

HR 60-80 bpm) and s/s of incr bleeding, left neck swelling, tracheal deviation


-Resuming ASA, statin medication tonight


-Plavix to resume in the AM 


-Type and screened 


-Vascular surgery consulted, Dr. Segura 





#CVA of right caudate nucleus, acute 


-Resolved left upper ext numbness, weakness since discharge from hospital in 

2020


-Recent lipid panel showed elevated cholesterol only 


-Resume ASA, Plavix, statin as discussed above 





#HTN


-Given labetalol and hydralazine post-op 


-Goal BP  mmHg systolic 


-C/w home amlodipine PO BID, hydralazine PRN 


-Low salt diet 





#HLD


-C/w statin 





#DVT px


-teds, SCDs. Incr bleeding risk so avoiding other AC at this time. 





DISPOSITION: Admitted to ICU for close monitoring. Vascular surgery following. 

Plan is home at discharge when medically improved.





Vital Signs





Vital Signs








  Date Time  Temp Pulse Resp B/P (MAP) Pulse Ox O2 Delivery O2 Flow Rate FiO2


 


21 16:35  54 18 103/41 (61) 100 Nasal Cannula 3 


 


21 15:55 97.0       











Laboratory Data


Labs 24H


Laboratory Tests 2


21 11:08: 


Nucleated Red Blood Cells % (auto) 0.0, Prothrombin Time 13.2, Prothromb Time 

International Ratio 0.98, Activated Partial Thromboplast Time 30.3, Anion Gap 

5L, Glomerular Filtration Rate > 60.0, Calcium Level 9.8


CBC/BMP


Laboratory Tests


21 11:08











Home Medications


Scheduled


Amlodipine Besylate (Amlodipine Besylate) 5 Mg Tablet, 5 MG PO BID


Aspirin (Aspirin EC) 81 Mg Tablet.dr, 81 MG PO DAILY


Atorvastatin Calcium (Atorvastatin Calcium) 80 Mg Tablet, 80 MG PO DAILY


Bimatoprost (Lumigan) 0.01% 2.5ML Drops, 1 DROP OU QHS


Calcium Carbonate/Vitamin D3 (Calcium 500-Vit D3 200 Caplet) 1 Each Tablet, 1 

TAB PO DAILY


Cholecalciferol (Vitamin D3) (Vitamin D3) 50 Mcg Tablet, 50 MCG PO DAILY


Clopidogrel Bisulfate (Clopidogrel) 75 Mg Tablet, 75 MG PO DAILY


Ubidecarenone (Co Q-10) 10 Mg Capsule, 100 MG PO DAILY


Vit A/Vit C/Vit E/Zinc/Copper (Preservision Areds Softgel) 1 Each Capsule, 1 CAP

PO BID


Vitamin B Complex (B Complex) 1 Each Tablet, 1 TAB PO DAILY





Allergies


Coded Allergies:  


     fish derived (Verified  Allergy, Intermediate, BONE FISH- RASH, AND 

VOMITING, 21)


     hydrochlorothiazide (Verified  Allergy, Intermediate, RASH, 21)


     shellfish derived (Verified  Allergy, Intermediate, RASH AND VOMITING, 

21)





A-FIB/CHADSVASC


A-FIB History


Current/History of A-Fib/PAF?:  No


Current PO Anticoag Therapy:  No





Age/Risk Factor Scoring


CHADSVASC:  








CHADSVASC Response (Comments) Value


 


Age Risk Factor Age >/= 75 years old 2


 


Gender Risk Factor Female 1


 


Hx of CHF No 0


 


Hx of HTN Yes 1


 


Hx of Stroke/TIA/or VTE Yes 2


 


Hx of Diabetes No 0


 


Hx of Vascular Disease Yes 1


 


Total  7











Treatment


Treatment ordered:  Other


Other anticoagulant ordered:  Lien Riley MD            2021 17:18

## 2021-01-28 VITALS — SYSTOLIC BLOOD PRESSURE: 152 MMHG | DIASTOLIC BLOOD PRESSURE: 63 MMHG

## 2021-01-28 VITALS — SYSTOLIC BLOOD PRESSURE: 116 MMHG | DIASTOLIC BLOOD PRESSURE: 55 MMHG

## 2021-01-28 VITALS — SYSTOLIC BLOOD PRESSURE: 126 MMHG | DIASTOLIC BLOOD PRESSURE: 60 MMHG

## 2021-01-28 VITALS — SYSTOLIC BLOOD PRESSURE: 129 MMHG | DIASTOLIC BLOOD PRESSURE: 53 MMHG

## 2021-01-28 VITALS — DIASTOLIC BLOOD PRESSURE: 56 MMHG | SYSTOLIC BLOOD PRESSURE: 114 MMHG

## 2021-01-28 VITALS — DIASTOLIC BLOOD PRESSURE: 56 MMHG | SYSTOLIC BLOOD PRESSURE: 119 MMHG

## 2021-01-28 VITALS — DIASTOLIC BLOOD PRESSURE: 52 MMHG | SYSTOLIC BLOOD PRESSURE: 124 MMHG

## 2021-01-28 VITALS — DIASTOLIC BLOOD PRESSURE: 49 MMHG | SYSTOLIC BLOOD PRESSURE: 119 MMHG

## 2021-01-28 VITALS — DIASTOLIC BLOOD PRESSURE: 62 MMHG | SYSTOLIC BLOOD PRESSURE: 149 MMHG

## 2021-01-28 VITALS — SYSTOLIC BLOOD PRESSURE: 139 MMHG | DIASTOLIC BLOOD PRESSURE: 63 MMHG

## 2021-01-28 VITALS — DIASTOLIC BLOOD PRESSURE: 61 MMHG | SYSTOLIC BLOOD PRESSURE: 128 MMHG

## 2021-01-28 VITALS — SYSTOLIC BLOOD PRESSURE: 133 MMHG | DIASTOLIC BLOOD PRESSURE: 53 MMHG

## 2021-01-28 VITALS — SYSTOLIC BLOOD PRESSURE: 118 MMHG | DIASTOLIC BLOOD PRESSURE: 57 MMHG

## 2021-01-28 VITALS — SYSTOLIC BLOOD PRESSURE: 114 MMHG | DIASTOLIC BLOOD PRESSURE: 48 MMHG

## 2021-01-28 VITALS — DIASTOLIC BLOOD PRESSURE: 59 MMHG | SYSTOLIC BLOOD PRESSURE: 154 MMHG

## 2021-01-28 LAB
ALBUMIN SERPL BCG-MCNC: 3 GM/DL (ref 3.2–5.2)
ALT SERPL W P-5'-P-CCNC: 16 U/L (ref 12–78)
BILIRUB SERPL-MCNC: 0.3 MG/DL (ref 0.2–1)
BUN SERPL-MCNC: 16 MG/DL (ref 7–18)
CALCIUM SERPL-MCNC: 7.8 MG/DL (ref 8.8–10.2)
CHLORIDE SERPL-SCNC: 107 MEQ/L (ref 98–107)
CO2 SERPL-SCNC: 25 MEQ/L (ref 21–32)
CREAT SERPL-MCNC: 0.74 MG/DL (ref 0.55–1.3)
GFR SERPL CREATININE-BSD FRML MDRD: > 60 ML/MIN/{1.73_M2} (ref 32–?)
GLUCOSE SERPL-MCNC: 118 MG/DL (ref 70–100)
HCT VFR BLD AUTO: 26 % (ref 36–47)
HCT VFR BLD AUTO: 29.1 % (ref 36–47)
HGB BLD-MCNC: 8.3 G/DL (ref 12–15.5)
HGB BLD-MCNC: 9.3 G/DL (ref 12–15.5)
MCH RBC QN AUTO: 29.7 PG (ref 27–33)
MCH RBC QN AUTO: 30.2 PG (ref 27–33)
MCHC RBC AUTO-ENTMCNC: 31.9 G/DL (ref 32–36.5)
MCHC RBC AUTO-ENTMCNC: 32 G/DL (ref 32–36.5)
MCV RBC AUTO: 93.2 FL (ref 80–96)
MCV RBC AUTO: 94.5 FL (ref 80–96)
PLATELET # BLD AUTO: 259 10^3/UL (ref 150–450)
PLATELET # BLD AUTO: 294 10^3/UL (ref 150–450)
POTASSIUM SERPL-SCNC: 4.1 MEQ/L (ref 3.5–5.1)
PROT SERPL-MCNC: 6.2 GM/DL (ref 6.4–8.2)
RBC # BLD AUTO: 2.79 10^6/UL (ref 4–5.4)
RBC # BLD AUTO: 3.08 10^6/UL (ref 4–5.4)
SODIUM SERPL-SCNC: 141 MEQ/L (ref 136–145)
WBC # BLD AUTO: 13.2 10^3/UL (ref 4–10)
WBC # BLD AUTO: 14 10^3/UL (ref 4–10)

## 2021-01-28 RX ADMIN — ASPIRIN SCH MG: 81 TABLET ORAL at 08:09

## 2021-01-28 RX ADMIN — HYDRALAZINE HYDROCHLORIDE PRN MG: 20 INJECTION INTRAMUSCULAR; INTRAVENOUS at 05:53

## 2021-01-28 RX ADMIN — CLOPIDOGREL BISULFATE SCH MG: 75 TABLET ORAL at 08:09

## 2021-01-28 RX ADMIN — HYDRALAZINE HYDROCHLORIDE PRN MG: 20 INJECTION INTRAMUSCULAR; INTRAVENOUS at 06:24

## 2021-01-28 RX ADMIN — OCTREOTIDE ACETATE PRN LOZ: KIT at 13:04

## 2021-01-28 RX ADMIN — OCTREOTIDE ACETATE PRN LOZ: KIT at 20:37

## 2021-01-28 NOTE — IPNPDOC
Date Seen


The patient was seen on 1/28/21.





Progress Note


Patient seen and examined postoperative day one status post left carotid 

endarterectomy. She is doing well. She is in good spirits. Neuro exam is intact.

She has no tongue deviation, speech is clear, alert and oriented 3, vision and 

hearing grossly intact, moves upper and lower extremities equally. No deficits 

noted. Her left neck incision is clean dry and intact. The drain was removed. 

She did have about 115 mL output postop with some hypertensive episodes, but 

when she went to the ICU and blood pressure was better controlled, her appetite 

was only 35 mL. This is minimal. Her neck is soft and no significant swelling is

noted. She tolerated the drain removal well.





Per the nurse, her blood pressure has remained less than 140 mmHg overnight, but

they just moved her around and sat her up and her pressure was a little higher 

when I saw her this morning. Artline is a good waveform. The cost pressure is 

about 40 mmHg less than the arterial line, so we are using the arterial line for

monitoring. He she just received her morning Norvasc, so if this brings down her

blood pressure, I don't think we need to treat with IV medication but otherwise 

we should leave the Artline and a sure we treat to keep the blood pressure less 

than 140 mmHg to minimize the risk of bleeding in the left neck. If the blood 

pressure is stable, I told the nurse that is okay to remove the arterial line 

and get her up and walk her around.





The patient and I discussed in clinic that she would be discharged postop day 1 

and she and her family needed to make arrangements if she wanted to have someone

stay with her postop. Today she tells me that her family is going snowmobiling, 

and they are not going to be available tonight for her if she needs it. I 

discussed with her that maybe she could ask them to postpone their trip a day. 

If she goes home tonight, it would be nice if her family was available to help 

her.





The patient's hemoglobin went from 12 preop to 8.3 postop. I'm not sure why, 

since she had minimal intraoperative blood loss and 145 mL total FABIAN output, 

which is serosanguineous. This is less than 1 unit of blood, for drop of 

hemoglobin of 4. Some of it is likely delusional, but I'm still unclear about 

the etiology of her postop anemia. She is asymptomatic, with heart rate 75 and 

blood pressure 160/80. We will recheck a hemoglobin this afternoon prior to 

discharge.





The patient's that overnight she felt a little rattle in her chest. She said she

had to wake up and take some deep breaths. I explained to her that frequently 

after surgery patient's care a little bit of atelectasis and also she had a lot 

of drainage and secretions after surgery which she could still be clearing. We 

will get her an incentive spirometer to help with deep breathing. If she is able

to ambulate, doing well with her deep breathing, no change in hemoglobin, and 

still hemodynamically stable, likely she'll be ready for discharge this 

afternoon.





We appreciate the opportunity to participate in the care of this patient.





VS, I&O, 24H, Sarah


Vital Signs/I&O





Vital Signs








  Date Time  Temp Pulse Resp B/P (MAP) Pulse Ox O2 Delivery O2 Flow Rate FiO2


 


1/28/21 08:09  74  148/55    


 


1/28/21 06:00   16  97 Room Air  


 


1/28/21 05:00       1.0 


 


1/28/21 04:00 98.9       














I&O- Last 24 Hours up to 6 AM 


 


 1/28/21





 06:00


 


Intake Total 1645 ml


 


Output Total 555 ml


 


Balance 1090 ml











Laboratory Data


24H LABS


Laboratory Tests 2


1/27/21 11:08: 


Nucleated Red Blood Cells % (auto) 0.0, Prothrombin Time 13.2, Prothromb Time 

International Ratio 0.98, Activated Partial Thromboplast Time 30.3, Anion Gap 

5L, Glomerular Filtration Rate > 60.0, Calcium Level 9.8


1/28/21 05:40: 


Nucleated Red Blood Cells % (auto) 0.0, Anion Gap 9, Glomerular Filtration Rate 

> 60.0, Calcium Level 7.8#L, Total Bilirubin 0.3, Aspartate Amino Transf 

(AST/SGOT) 20, Alanine Aminotransferase (ALT/SGPT) 16, Alkaline Phosphatase 60, 

Total Protein 6.2L, Albumin 3.0L, Albumin/Globulin Ratio 0.9L


CBC/BMP


Laboratory Tests


1/27/21 11:08








1/28/21 05:40

















REINIER NASH MD         Jan 28, 2021 08:57

## 2021-01-28 NOTE — IPNPDOC
Date Seen


The patient was seen on 1/28/21.





Progress Note


SUBJECTIVE: 


Arterial line, left side neck FABIAN removed this AM. 35 mL blood loss overnight but

H/H decreased, minimal blood loss including that lost during surgery. Discussed 

with Dr. Segura. PT/OT ordered, as patient states she feels weak. Med/surg 

tele. Denies chest pain, n/v/d, fevers, chills. 





OBJECTIVE: 





PHYSICAL EXAMINATION: 


VS: Please see below 


CONSTITUTIONAL: No acute distress, resting comfortably, AAO x 3


EYES: PERRLA, EOM intact


HENT, MOUTH: Normocephalic, atraumatic, moist mucous membranes


NECK: SUPPLE, no JVD, no lymphadenopathy, no carotid bruit. Clean neck 

incisions, no erythema or tenderness 


CV: Regular rate and rhythm, S1S2 normal, no murmurs/rubs/gallops


RESPIRATORY: Clear to auscultation bilaterally, no rales/rhonchi/wheezes


GI:  BS positive in 4 quadrants, soft, nontender, nondistended, no rebound or 

guarding, no organomegaly


: Deferred


MUSCULOSKELETAL: Normal ROM. No cyanosis, clubbing, swelling, joint deformity, 

extremity edema


INTEGUMENTARY:  Intact, no rashes, no lesions, no erythema


NEUROLOGIC: Cranial Nerves II-XII are intact, no focal deficits


PSYCHIATRIC: Mood and affect are normal





CURRENT MEDICATIONS: Please see below 





LABORATORY DATA: Please see below 





IMAGING: 





Carotid US 12/15/20: 


Bulky calcified plaques in the left carotid bulb extending to the proximal left 

ICA resulting in more than 70% (but less than near occlusion) stenosis. 





ASSESSMENT:  81 y/o F with PMH Of HTN, HLD, OA, left carotid stenosis, recent 

right caudate nucleus CVA who was was had elective left side carotid 

endarterectomy performed on 1/27/21 by Dr. Segura, admitted to ICU for 

close monitoring post-operatively with vascular surgery consulted to follow. 





PLAN: 





#Left side carotid stenosis s/p left carotid endarterectomy POD 1


-Minimal blood loss post-surgery


-FABIAN drain removed this AM


-Close monitoring of BP


-C/w ASA, Plavix, statin 


-Vascular surgery consulted, Dr. Segura 





#CVA of right caudate nucleus, acute 


-Resolved left upper ext numbness, weakness since discharge from hospital in 

12/2020


-Recent lipid panel showed elevated cholesterol only 


-C/w ASA, Plavix, statin as discussed above 





#Weakness post-op, expected 


-PT: suggesting another day of working with therapy, unsteady on feet 


-C/w PT/OT while here, goal is home 





#Anemia, acute. Possibly dilutional component, as she has not had more than 300 

mL blood loss with surgery


-H/H 8.3/26, weak but otherwise asymptomatic


-Stopped IVFs 


-F/u repeat CBC at 3 PM today 


-Goal to transfuse is likely < 8 or worsening symptoms 


-Type and screen done 


-Daily CBC  





#HTN


-Resumed home amlodipine PO BID 


-Monitor closely  





#HLD


-C/w statin 





#DVT px


-teds, SCDs. 





DISPOSITION: Med/surg tele. Vascular surgery following. Plan is home at 

discharge when medically improved.





VS, I&O, 24H, Fishbone


Vital Signs/I&O





Vital Signs








  Date Time  Temp Pulse Resp B/P (MAP) Pulse Ox O2 Delivery O2 Flow Rate FiO2


 


1/28/21 13:01 97.3 84 20 116/55 95 Room Air  





    Arterial Line    


 


1/28/21 05:00       1.0 














I&O- Last 24 Hours up to 6 AM 


 


 1/28/21





 06:00


 


Intake Total 1645 ml


 


Output Total 555 ml


 


Balance 1090 ml











Laboratory Data


24H LABS


Laboratory Tests 2


1/28/21 05:40: 


Nucleated Red Blood Cells % (auto) 0.0, Anion Gap 9, Glomerular Filtration Rate 

> 60.0, Calcium Level 7.8#L, Total Bilirubin 0.3, Aspartate Amino Transf 

(AST/SGOT) 20, Alanine Aminotransferase (ALT/SGPT) 16, Alkaline Phosphatase 60, 

Total Protein 6.2L, Albumin 3.0L, Albumin/Globulin Ratio 0.9L


CBC/BMP


Laboratory Tests


1/28/21 05:40











Current Medications





Current Medications








 Medications


  (Trade)  Dose


 Ordered  Sig/Alex


 Route


 PRN Reason  Start Time


 Stop Time Status Last Admin


Dose Admin


 


 Amlodipine


 Besylate


  (Norvasc)  5 mg  BID


 PO


   1/27/21 21:00


    1/28/21 08:09





 


 Artificial Tears


  (Akwa Tears)  1 drop  QHS  PRN


 OU


 DRY EYES  1/27/21 16:15


     





 


 Aspirin


  (Ecotrin)  81 mg  DAILY


 PO


   1/28/21 09:00


    1/28/21 08:09





 


 Atorvastatin


 Calcium


  (Lipitor)  80 mg  QHS


 PO


   1/28/21 21:00


     





 


 Cetylpyridinium


 Chloride


  (Cepacol)  1 tucker  Q4HP  PRN


 PO


 SORE THROAT  1/28/21 10:45


    1/28/21 13:04





 


 Clopidogrel


 Bisulfate


  (PLAVix)  75 mg  DAILY


 PO


   1/28/21 09:00


    1/28/21 08:09





 


 Fentanyl Citrate


  (Sublimaze)  25 mcg  Q5MP  PRN


 IV


 PAIN LEVEL 5-10  1/27/21 16:30


     





 


 Hydralazine HCl


  (Apresoline)  10 mg  Q15MP  PRN


 IV


 hypertension  1/27/21 16:00


 1/28/21 10:21 DC 1/28/21 06:24





 


 Labetalol HCl


  (Normodyne,


 Trandate)  5 mg  ASDIRECTED  PRN


 IV


 Keep SBP<140; DBP<80  1/27/21 16:30


     





 


 Labetalol HCl


  (Normodyne,


 Trandate)  10 mg  Q15MP  PRN


 IV


 hypertension  1/27/21 16:00


 1/28/21 10:21 DC  





 


 Lactated Ringer's  1,000 ml @ 


 100 mls/hr  Q10H


 IV


   1/27/21 16:30


 1/27/21 17:30 DC  





 


 Meperidine HCl


  (Demerol)  12.5 mg  Q5MP  PRN


 IV


 SHIVERING  1/27/21 16:30


 1/27/21 17:30 DC  





 


 Metoclopramide HCl


  (REGLAN


 INJection)  10 mg  Q6HP  PRN


 IV


 NAUSEA OR VOMITING  1/27/21 16:30


 1/27/21 17:30 DC  





 


 Ondansetron HCl


  (ZOFRAN


 INJection)  4 mg  Q4HP  PRN


 IV


 NAUSEA OR VOMITING  1/27/21 16:30


 1/27/21 17:30 DC  





 


 Ondansetron HCl


  (ZOFRAN


 INJection)  4 mg  Q6HP  PRN


 IV


 NAUSEA OR VOMITING  1/27/21 16:00


     





 


 Oxycodone HCl


  (Roxicodone,


 Oxyir)  5 mg  ASDIRECTED  PRN


 PO


 PAIN LEVEL 1-4  1/27/21 16:30


 1/27/21 17:30 DC  





 


 Oxycodone/


 Acetaminophen


  (Percocet 5mg/


 325mg Tablet)  1 tab  Q4HP  PRN


 PO


 MODERATE PAIN (PS 5-7)  1/27/21 16:00


     





 


 Oxycodone/


 Acetaminophen


  (Percocet 5mg/


 325mg Tablet)  2 tab  Q4HP  PRN


 PO


 SEVERE PAIN (PS 8-10)  1/27/21 16:00


     





 


 Phenol


  (Chloraseptic


 Spray)  1 spray  Q2HP  PRN


 MT


 SORE THROAT  1/27/21 16:00


     














Allergies


Coded Allergies:  


     fish derived (Verified  Allergy, Intermediate, BONE FISH- RASH, AND 

VOMITING, 1/27/21)


     hydrochlorothiazide (Verified  Allergy, Intermediate, RASH, 1/27/21)


     shellfish derived (Verified  Allergy, Intermediate, RASH AND VOMITING, 

1/27/21)











Lien Simmons MD            Jan 28, 2021 14:44

## 2021-01-29 VITALS — DIASTOLIC BLOOD PRESSURE: 74 MMHG | SYSTOLIC BLOOD PRESSURE: 139 MMHG

## 2021-01-29 VITALS — DIASTOLIC BLOOD PRESSURE: 65 MMHG | SYSTOLIC BLOOD PRESSURE: 135 MMHG

## 2021-01-29 LAB
ALBUMIN SERPL BCG-MCNC: 3.3 GM/DL (ref 3.2–5.2)
ALT SERPL W P-5'-P-CCNC: 14 U/L (ref 12–78)
BILIRUB SERPL-MCNC: 0.4 MG/DL (ref 0.2–1)
BUN SERPL-MCNC: 15 MG/DL (ref 7–18)
CALCIUM SERPL-MCNC: 8.5 MG/DL (ref 8.8–10.2)
CHLORIDE SERPL-SCNC: 106 MEQ/L (ref 98–107)
CO2 SERPL-SCNC: 27 MEQ/L (ref 21–32)
CREAT SERPL-MCNC: 0.71 MG/DL (ref 0.55–1.3)
GFR SERPL CREATININE-BSD FRML MDRD: > 60 ML/MIN/{1.73_M2} (ref 32–?)
GLUCOSE SERPL-MCNC: 104 MG/DL (ref 70–100)
HCT VFR BLD AUTO: 29 % (ref 36–47)
HGB BLD-MCNC: 9.3 G/DL (ref 12–15.5)
MCH RBC QN AUTO: 30.1 PG (ref 27–33)
MCHC RBC AUTO-ENTMCNC: 32.1 G/DL (ref 32–36.5)
MCV RBC AUTO: 93.9 FL (ref 80–96)
PLATELET # BLD AUTO: 262 10^3/UL (ref 150–450)
POTASSIUM SERPL-SCNC: 4 MEQ/L (ref 3.5–5.1)
PROT SERPL-MCNC: 6.3 GM/DL (ref 6.4–8.2)
RBC # BLD AUTO: 3.09 10^6/UL (ref 4–5.4)
SODIUM SERPL-SCNC: 142 MEQ/L (ref 136–145)
WBC # BLD AUTO: 11.9 10^3/UL (ref 4–10)

## 2021-01-29 RX ADMIN — ASPIRIN SCH MG: 81 TABLET ORAL at 08:55

## 2021-01-29 RX ADMIN — CLOPIDOGREL BISULFATE SCH MG: 75 TABLET ORAL at 08:55

## 2021-01-29 NOTE — IPNPDOC
Date Seen


The patient was seen on 1/29/21.





Progress Note


Patient seen and examined postoperative day two status post left carotid 

endarterectomy. She is doing well. She stayed overnight because her family went 

on a snowmobiling trip yesterday and were not available to help her at home. 

Neuro exam is intact. She has no tongue deviation, speech is clear, alert and 

oriented 3, vision and hearing grossly intact, moves upper and lower 

extremities equally. No deficits noted. Her left neck incision is clean dry and 

intact. The drain was removed yesterday. She did have about 115 mL output postop

with some hypertensive episodes, but when she went to the ICU and blood pressure

was better controlled, her output from the FABIAN was only 35 mL overnight. No d

rainage after removal, but there minimal serosanguineous shadow on the Steri-

Strips. Her neck is soft and no significant swelling is noted. Blood pressure 

stable.





The patient's hemoglobin went from 12 preop to 8.3 postop. I'm not sure why, 

since she had minimal intraoperative blood loss and 145 mL total FABIAN output, 

which is serosanguineous. This is less than 1 unit of blood, for drop of 

hemoglobin of 4. Some of it is likely dilutional, but I'm still unclear about 

the etiology of her postop anemia. Recheck later in the afternoon revealed a 

hemoglobin of 9.3, and it is stable this morning. She is asymptomatic, with 

normal heart rate and blood pressure. 





The patient said that the first night she felt a little rattle in her chest. She

said she had to wake up and take some deep breaths. I explained to her that 

frequently after surgery patient's can have a little bit of atelectasis. She 

also she had a lot of nasal drainage and secretions after surgery, which she 

could still be clearing. We instructed her on how to use and incentive 

spirometer to help with deep breathing yesterday and again this morning. She is 

able to inspire 1-1.2 L at this point with her incentive spirometer. I told her 

to work up to 1.5 L in the next few days, and then eventually up to 2 L. She 

says she didn't have any difficulty overnight with feeling short of breath or 

feeling like she had a lot of drainage. This should continue to improve.





Vascular discharge instructions:





Follow-up in 1 week to check incision.


Light activity as tolerated. No lifting greater than 5 pounds, no strenuous 

exercise for 1 week.


Okay for ice to left neck for swelling and comfort.


Okay to shower, pat Steri-Strips dry with clean towel after showering.


Try to leave Steri-Strips intact for 7 days to help incision to heal.





We appreciate the opportunity to participate in the care of this patient.





VS, I&O, 24H, Fishbone


Vital Signs/I&O





Vital Signs








  Date Time  Temp Pulse Resp B/P (MAP) Pulse Ox O2 Delivery O2 Flow Rate FiO2


 


1/29/21 06:00 97.2 76 17 135/65 (88) 96 Room Air  


 


1/28/21 05:00       1.0 














I&O- Last 24 Hours up to 6 AM 


 


 1/29/21





 05:59


 


Intake Total 960 ml


 


Output Total 505 ml


 


Balance 455 ml











Laboratory Data


24H LABS


Laboratory Tests 2


1/28/21 14:53: Nucleated Red Blood Cells % (auto) 0.0


1/29/21 05:57: 


Nucleated Red Blood Cells % (auto) 0.0, Anion Gap 9, Glomerular Filtration Rate 

> 60.0, Calcium Level 8.5L, Total Bilirubin 0.4, Aspartate Amino Transf 

(AST/SGOT) 23, Alanine Aminotransferase (ALT/SGPT) 14, Alkaline Phosphatase 70, 

Total Protein 6.3L, Albumin 3.3, Albumin/Globulin Ratio 1.1L


CBC/BMP


Laboratory Tests


1/28/21 14:53








1/29/21 05:57

















REINIER NASH MD         Jan 29, 2021 08:42

## 2021-01-29 NOTE — DS.PDOC
Discharge Summary


General


Date of Admission


2021 at 10:41


Date of Discharge


21


Attending Physician:  Lien Simmons MD





Discharge Summary


HPI: 


Patient is an 81 y/o F with PMH Of HTN, HLD, OA, left carotid stenosis, recent 

right caudate nucleus CVA who was was had elective left side carotid 

endarterectomy performed on 21 by Dr. Segura. Ms. Carrillo had a recen

t right caudate nucleus CVA and during that admission, had carotid US showing 

>70% stenosis of left  carotid artery. She was set up to follow up with vascular

surgery, Dr. Segura, as o/p after discharge. Intraoperatively there were no

complications. Minimal blood loss of 75 mL was recorded. Post-operatively she 

had two isolated episodes of hypertension for which she was given hydralazine 

and labetalol IV. Goal blood pressure post-op is  mmHg systolic with HR 

60-80 bpm. Medicine team was consulted to admit patient as primary post-

operatively. 





Post-op patient only complained of some mild left neck pressure. She denied 

chest pain, shortness of breath, n/v, blurry vision, headache, arm 

numbness/tingling, LH, dizziness, increased weakness. BP appeared within goal on

my evaluation. FABIAN drain had sanguinous blood, total of 50 mL. According to 

nursing she has had approx 100 mL in addition to 75 mL of blood loss thus far. 

Per vascular request, ASA and statin, along with other home meds were resumed. 

Patient was admitted to ICU for close monitoring post-operatively with vascular 

surgery consulted to follow. 





HOSPITAL COURSE: 


H/H was watched closely and slowly improved after 24 hours and off IVFs. Patient

was reassessed by PT on POD 2, much improved. She was tolerating medical therapy

well. Decision was made to d/c home with close follow up with both vascular 

surgery and PCP. At time of discharge, patient denied chest pain, n/v/d, 

shortness of breath, neck pain, fevers or chills. 





PMH: 


Right caudate nucleus CVA


Left carotid stenosis 


Hypertension


Hyperlipidemia


Osteoarthritis


Macular degeneration of the right eye





PSH: 


Left carotid endarterectomy 21 


Left total hip arthroplasty


Right total knee arthroplasty





Family History:


Her father  at the age of 78 of prostate cancer


Her mother  at the age of 90 of Alzheimer's disease and osteoporosis





Social History


Smoker:  former Smoker (quit smoking in the 1970s)


Alcohol:  other (she reports she drinks 2-3 beers each night)


Drugs:  denies (but she does have CBD oil in her coffee most every day)


She lives in a separate house on her son's property





ALLERGIES: 


Please see below. 





DISCHARGE MEDICATIONS: 


Please see below.





PHYSICAL EXAMINATION: 


VS: Please see below 


CONSTITUTIONAL: No acute distress, resting comfortably, AAO x 3


EYES: PERRLA, EOM intact


HENT, MOUTH: Normocephalic, atraumatic, moist mucous membranes


NECK: SUPPLE, no JVD, no lymphadenopathy, no carotid bruit. Clean neck 

incisions, no erythema or tenderness 


CV: Regular rate and rhythm, S1S2 normal, no murmurs/rubs/gallops


RESPIRATORY: Clear to auscultation bilaterally, no rales/rhonchi/wheezes


GI:  BS positive in 4 quadrants, soft, nontender, nondistended, no rebound or 

guarding, no organomegaly


: Deferred


MUSCULOSKELETAL: Normal ROM. No cyanosis, clubbing, swelling, joint deformity, 

extremity edema


INTEGUMENTARY:  Intact, no rashes, no lesions, no erythema


NEUROLOGIC: Cranial Nerves II-XII are intact, no focal deficits


PSYCHIATRIC: Mood and affect are normal





CURRENT MEDICATIONS: Please see below 





LABORATORY DATA: Please see below 





IMAGING: 





Carotid US 12/15/20: 


Bulky calcified plaques in the left carotid bulb extending to the proximal left 

ICA resulting in more than 70% (but less than near occlusion) stenosis. 





ASSESSMENT:  81 y/o F with PMH Of HTN, HLD, OA, left carotid stenosis, recent 

right caudate nucleus CVA who was was had elective left side carotid 

endarterectomy performed on 21 by Dr. Segura, admitted to ICU for 

close monitoring post-operatively with vascular surgery consulted to follow. 





PLAN: 





#Left side carotid stenosis s/p left carotid endarterectomy POD 2


-C/w ASA, Plavix, statin 


-Vascular surgery (Dr. Segura) to follow up as o/p 


-Please refer to discharge instructions per vascular 





#CVA of right caudate nucleus, acute 


-Resolved left upper ext numbness, weakness since discharge from hospital in 

2020


-Recent lipid panel showed elevated cholesterol only 


-C/w ASA, Plavix, statin





#Anemia, acute. Possibly dilutional component, as she has not had more than 300 

mL blood loss with surgery


-H/H improved


-F/u with PCP





#HTN


-C/w  amlodipine PO BID 





#HLD


-C/w statin 





DISPOSITION: D/c home with f/u with vascular surgery, PCP. 





TIME SPENT ON DISCHARGE: Greater than 30 minutes.





Vital Signs/I&Os





Vital Signs








  Date Time  Temp Pulse Resp B/P (MAP) Pulse Ox O2 Delivery O2 Flow Rate FiO2


 


21 08:56  82  139/74    


 


21 06:00 97.2  17  96 Room Air  


 


21 05:00       1.0 














I&O- Last 24 Hours up to 6 AM 


 


 21





 06:00


 


Intake Total 960 ml


 


Output Total 300 ml


 


Balance 660 ml











Laboratory Data


Labs 24H


Laboratory Tests 2


21 05:57: 


Nucleated Red Blood Cells % (auto) 0.0, Anion Gap 9, Glomerular Filtration Rate 

> 60.0, Calcium Level 8.5L, Total Bilirubin 0.4, Aspartate Amino Transf 

(AST/SGOT) 23, Alanine Aminotransferase (ALT/SGPT) 14, Alkaline Phosphatase 70, 

Total Protein 6.3L, Albumin 3.3, Albumin/Globulin Ratio 1.1L


CBC/BMP


Laboratory Tests


21 05:57











Discharge Medications


Scheduled


Amlodipine Besylate (Amlodipine Besylate) 5 Mg Tablet, 5 MG PO BID, (Reported)


Aspirin (Aspirin EC) 81 Mg Tablet.dr, 81 MG PO DAILY


Atorvastatin Calcium (Atorvastatin Calcium) 80 Mg Tablet, 80 MG PO DAILY, 

(Reported)


Bimatoprost (Lumigan) 0.01% 2.5ML Drops, 1 DROP OU QHS, (Reported)


Calcium Carbonate/Vitamin D3 (Calcium 500-Vit D3 200 Caplet) 1 Each Tablet, 1 

TAB PO DAILY, (Reported)


Cholecalciferol (Vitamin D3) (Vitamin D3) 50 Mcg Tablet, 50 MCG PO DAILY, 

(Reported)


Clopidogrel Bisulfate (Clopidogrel) 75 Mg Tablet, 75 MG PO DAILY


Ubidecarenone (Co Q-10) 10 Mg Capsule, 100 MG PO DAILY, (Reported)


Vit A/Vit C/Vit E/Zinc/Copper (Preservision Areds Softgel) 1 Each Capsule, 1 CAP

PO BID, (Reported)


Vitamin B Complex (B Complex) 1 Each Tablet, 1 TAB PO DAILY, (Reported)





Scheduled PRN


Tramadol HCl (Tramadol HCl) 50 Mg Tablet, 50 MG PO Q8HP PRN for pain





Allergies


Coded Allergies:  


     fish derived (Verified  Allergy, Intermediate, BONE FISH- RASH, AND 

VOMITING, 21)


     hydrochlorothiazide (Verified  Allergy, Intermediate, RASH, 21)


     shellfish derived (Verified  Allergy, Intermediate, RASH AND VOMITING, 

21)











Lien Simmons MD            2021 16:41

## 2021-02-18 ENCOUNTER — HOSPITAL ENCOUNTER (OUTPATIENT)
Dept: HOSPITAL 53 - M SHH | Age: 81
End: 2021-02-18
Attending: PHYSICIAN ASSISTANT
Payer: MEDICARE

## 2021-02-18 DIAGNOSIS — I63.9: Primary | ICD-10-CM

## 2021-02-18 LAB
ALBUMIN SERPL BCG-MCNC: 4.1 GM/DL (ref 3.2–5.2)
ALT SERPL W P-5'-P-CCNC: 22 U/L (ref 12–78)
BASOPHILS # BLD AUTO: 0.1 10^3/UL (ref 0–0.2)
BASOPHILS NFR BLD AUTO: 0.7 % (ref 0–1)
BILIRUB SERPL-MCNC: 0.3 MG/DL (ref 0.2–1)
BUN SERPL-MCNC: 18 MG/DL (ref 7–18)
CALCIUM SERPL-MCNC: 9.4 MG/DL (ref 8.8–10.2)
CHLORIDE SERPL-SCNC: 103 MEQ/L (ref 98–107)
CHOLEST SERPL-MCNC: 163 MG/DL (ref ?–200)
CHOLEST/HDLC SERPL: 2.76 {RATIO} (ref ?–5)
CO2 SERPL-SCNC: 30 MEQ/L (ref 21–32)
CREAT SERPL-MCNC: 0.86 MG/DL (ref 0.55–1.3)
EOSINOPHIL # BLD AUTO: 0.5 10^3/UL (ref 0–0.5)
EOSINOPHIL NFR BLD AUTO: 7.3 % (ref 0–3)
GFR SERPL CREATININE-BSD FRML MDRD: > 60 ML/MIN/{1.73_M2} (ref 32–?)
GLUCOSE SERPL-MCNC: 99 MG/DL (ref 70–100)
HCT VFR BLD AUTO: 34.8 % (ref 36–47)
HDLC SERPL-MCNC: 59 MG/DL (ref 40–?)
HGB BLD-MCNC: 10.9 G/DL (ref 12–15.5)
LDLC SERPL CALC-MCNC: 82 MG/DL (ref ?–100)
LYMPHOCYTES # BLD AUTO: 1.9 10^3/UL (ref 1.5–5)
LYMPHOCYTES NFR BLD AUTO: 27.7 % (ref 24–44)
MCH RBC QN AUTO: 30.3 PG (ref 27–33)
MCHC RBC AUTO-ENTMCNC: 31.3 G/DL (ref 32–36.5)
MCV RBC AUTO: 96.7 FL (ref 80–96)
MONOCYTES # BLD AUTO: 0.4 10^3/UL (ref 0–0.8)
MONOCYTES NFR BLD AUTO: 6.4 % (ref 2–8)
NEUTROPHILS # BLD AUTO: 3.8 10^3/UL (ref 1.5–8.5)
NEUTROPHILS NFR BLD AUTO: 57.6 % (ref 36–66)
NONHDLC SERPL-MCNC: 104 MG/DL
PLATELET # BLD AUTO: 417 10^3/UL (ref 150–450)
POTASSIUM SERPL-SCNC: 4.9 MEQ/L (ref 3.5–5.1)
PROT SERPL-MCNC: 7.4 GM/DL (ref 6.4–8.2)
RBC # BLD AUTO: 3.6 10^6/UL (ref 4–5.4)
SODIUM SERPL-SCNC: 140 MEQ/L (ref 136–145)
TRIGL SERPL-MCNC: 111 MG/DL (ref ?–150)
WBC # BLD AUTO: 6.7 10^3/UL (ref 4–10)

## 2021-03-08 ENCOUNTER — HOSPITAL ENCOUNTER (OUTPATIENT)
Dept: HOSPITAL 53 - M RAD | Age: 81
End: 2021-03-08
Attending: PHYSICIAN ASSISTANT
Payer: MEDICARE

## 2021-03-08 DIAGNOSIS — I65.22: Primary | ICD-10-CM

## 2021-03-08 NOTE — REP
INDICATION:

OCCLUSION AND STENOSIS OF LEFT CAROTID ART



COMPARISON:

12/15/2020



TECHNIQUE:

Gray scale and color Doppler evaluation using linear high frequency transducer

Findings:



FINDINGS:

Two-dimensional gray scale and color images demonstrate mild atheromatous plaquing to

the right carotid arterial system with normal laminar flow and no evidence for

stenosis or occlusion.  Left carotid arterial system demonstrates mild intimal

thickening with normal laminar flow and no evidence for stenosis or occlusion.

Doppler interrogation demonstrates bilateral arterial wave patterns with moderate

spectral broadening.  Normal flow direction to the bilateral vertebral arteries noted..



ICA peak systolic velocity:  Right 64.8 cm/s; Left 91.9 cm/s

ICA diastolic velocity:  Right 21.3 cm/s; Left 29.7 cm/s

ECA peak systolic velocity:  Right 101.8 cm/s; Left 105.8 cm/s

CCA peak systolic velocity:  Right 101.8 cm/s; Left 69.9 cm/s

ICA/CCA ratio:  Right 0.63 cm/s; Left 1.31 cm/s



IMPRESSION:

No hemodynamically significant areas of narrowing or stenosis appreciated.

Based on set standards narrowing falls within the less than 50% range bilaterally.





<Electronically signed by Yury Duarte > 03/08/21 1520

## 2021-06-22 ENCOUNTER — HOSPITAL ENCOUNTER (OUTPATIENT)
Dept: HOSPITAL 53 - M LAB | Age: 81
End: 2021-06-22
Attending: FAMILY MEDICINE
Payer: COMMERCIAL

## 2021-06-22 DIAGNOSIS — I10: ICD-10-CM

## 2021-06-22 DIAGNOSIS — I63.232: Primary | ICD-10-CM

## 2021-06-22 LAB
ALBUMIN SERPL BCG-MCNC: 4 GM/DL (ref 3.2–5.2)
ALT SERPL W P-5'-P-CCNC: 19 U/L (ref 12–78)
BASOPHILS # BLD AUTO: 0.1 10^3/UL (ref 0–0.2)
BASOPHILS NFR BLD AUTO: 0.9 % (ref 0–1)
BILIRUB SERPL-MCNC: 0.5 MG/DL (ref 0.2–1)
BUN SERPL-MCNC: 16 MG/DL (ref 7–18)
CALCIUM SERPL-MCNC: 9.5 MG/DL (ref 8.8–10.2)
CHLORIDE SERPL-SCNC: 105 MEQ/L (ref 98–107)
CHOLEST SERPL-MCNC: 155 MG/DL (ref ?–200)
CHOLEST/HDLC SERPL: 2.77 {RATIO} (ref ?–5)
CO2 SERPL-SCNC: 32 MEQ/L (ref 21–32)
CREAT SERPL-MCNC: 0.71 MG/DL (ref 0.55–1.3)
EOSINOPHIL # BLD AUTO: 0.4 10^3/UL (ref 0–0.5)
EOSINOPHIL NFR BLD AUTO: 4.8 % (ref 0–3)
GFR SERPL CREATININE-BSD FRML MDRD: > 60 ML/MIN/{1.73_M2} (ref 32–?)
GLUCOSE SERPL-MCNC: 92 MG/DL (ref 70–100)
HCT VFR BLD AUTO: 37.4 % (ref 36–47)
HDLC SERPL-MCNC: 56 MG/DL (ref 40–?)
HGB BLD-MCNC: 11.7 G/DL (ref 12–15.5)
LDLC SERPL CALC-MCNC: 73 MG/DL (ref ?–100)
LYMPHOCYTES # BLD AUTO: 2 10^3/UL (ref 1.5–5)
LYMPHOCYTES NFR BLD AUTO: 24.9 % (ref 24–44)
MCH RBC QN AUTO: 29.1 PG (ref 27–33)
MCHC RBC AUTO-ENTMCNC: 31.3 G/DL (ref 32–36.5)
MCV RBC AUTO: 93 FL (ref 80–96)
MONOCYTES # BLD AUTO: 0.6 10^3/UL (ref 0–0.8)
MONOCYTES NFR BLD AUTO: 7 % (ref 2–8)
NEUTROPHILS # BLD AUTO: 4.9 10^3/UL (ref 1.5–8.5)
NEUTROPHILS NFR BLD AUTO: 62 % (ref 36–66)
NONHDLC SERPL-MCNC: 99 MG/DL
PLATELET # BLD AUTO: 308 10^3/UL (ref 150–450)
POTASSIUM SERPL-SCNC: 4.2 MEQ/L (ref 3.5–5.1)
PROT SERPL-MCNC: 7.3 GM/DL (ref 6.4–8.2)
RBC # BLD AUTO: 4.02 10^6/UL (ref 4–5.4)
SODIUM SERPL-SCNC: 140 MEQ/L (ref 136–145)
T4 FREE SERPL-MCNC: 1 NG/DL (ref 0.76–1.46)
TRIGL SERPL-MCNC: 130 MG/DL (ref ?–150)
TSH SERPL DL<=0.005 MIU/L-ACNC: 2.16 UIU/ML (ref 0.36–3.74)
WBC # BLD AUTO: 7.9 10^3/UL (ref 4–10)

## 2021-10-29 ENCOUNTER — HOSPITAL ENCOUNTER (OUTPATIENT)
Dept: HOSPITAL 53 - M RAD | Age: 81
End: 2021-10-29
Attending: SURGERY
Payer: COMMERCIAL

## 2021-10-29 DIAGNOSIS — I65.23: Primary | ICD-10-CM

## 2021-10-29 NOTE — REP
INDICATION:

Assess stenosis



TECHNIQUE:

Carotid ultrasonography was performed bilaterally



FINDINGS:

Right:



CCA systolic: 76.9 centimeters/second      CCA diastolic: 16.8 centimeters/second



ICA systolic: 84.1 centimeters/second      ICA diastolic: 26.0 centimeters/second



ICA CCA ratio: 1.09



Left:



CCA systolic: 68.9 centimeters/second      CCA diastolic: 12.3 centimeters/second





ICA systolic: 70.6 centimeters/second      ICA diastolic: 25.1 centimeters/second



ICA CCA ratio: 1.13



Vertebral artery: Right: Antegrade flow      left: Antegrade flow



Echogenic material seen along the carotid arterial walls some of which casts and

acoustic shadow



IMPRESSION:

According to the SRU criteria there is less than 50% stenosis of the internal carotid

artery bilaterally.  This is secondary to both calcified and noncalcified atheromatous

plaque formation.





<Electronically signed by Kal Mendoza > 10/29/21 [id number]

## 2021-11-01 ENCOUNTER — HOSPITAL ENCOUNTER (OUTPATIENT)
Dept: HOSPITAL 53 - M PLALAB | Age: 81
End: 2021-11-01
Attending: PHYSICIAN ASSISTANT
Payer: COMMERCIAL

## 2021-11-01 DIAGNOSIS — I63.232: Primary | ICD-10-CM

## 2021-11-01 DIAGNOSIS — I10: ICD-10-CM

## 2021-11-01 DIAGNOSIS — D64.9: ICD-10-CM

## 2021-11-01 LAB
ALBUMIN SERPL BCG-MCNC: 3.8 GM/DL (ref 3.2–5.2)
ALT SERPL W P-5'-P-CCNC: 19 U/L (ref 12–78)
BASOPHILS # BLD AUTO: 0.1 10^3/UL (ref 0–0.2)
BASOPHILS NFR BLD AUTO: 0.7 % (ref 0–1)
BILIRUB SERPL-MCNC: 0.5 MG/DL (ref 0.2–1)
BUN SERPL-MCNC: 15 MG/DL (ref 7–18)
CALCIUM SERPL-MCNC: 9.8 MG/DL (ref 8.8–10.2)
CHLORIDE SERPL-SCNC: 108 MEQ/L (ref 98–107)
CHOLEST SERPL-MCNC: 174 MG/DL (ref ?–200)
CHOLEST/HDLC SERPL: 2.81 {RATIO} (ref ?–5)
CO2 SERPL-SCNC: 30 MEQ/L (ref 21–32)
CREAT SERPL-MCNC: 0.74 MG/DL (ref 0.55–1.3)
EOSINOPHIL # BLD AUTO: 0.4 10^3/UL (ref 0–0.5)
EOSINOPHIL NFR BLD AUTO: 4.5 % (ref 0–3)
GFR SERPL CREATININE-BSD FRML MDRD: > 60 ML/MIN/{1.73_M2} (ref 32–?)
GLUCOSE SERPL-MCNC: 88 MG/DL (ref 70–100)
HCT VFR BLD AUTO: 37.7 % (ref 36–47)
HDLC SERPL-MCNC: 62 MG/DL (ref 40–?)
HGB BLD-MCNC: 11.7 G/DL (ref 12–15.5)
IRON SATN MFR SERPL: 31.2 % (ref 13.2–45)
IRON SERPL-MCNC: 86 UG/DL (ref 50–170)
LDLC SERPL CALC-MCNC: 92 MG/DL (ref ?–100)
LYMPHOCYTES # BLD AUTO: 1.9 10^3/UL (ref 1.5–5)
LYMPHOCYTES NFR BLD AUTO: 19.7 % (ref 24–44)
MCH RBC QN AUTO: 29.8 PG (ref 27–33)
MCHC RBC AUTO-ENTMCNC: 31 G/DL (ref 32–36.5)
MCV RBC AUTO: 95.9 FL (ref 80–96)
MONOCYTES # BLD AUTO: 0.6 10^3/UL (ref 0–0.8)
MONOCYTES NFR BLD AUTO: 6.1 % (ref 2–8)
NEUTROPHILS # BLD AUTO: 6.5 10^3/UL (ref 1.5–8.5)
NEUTROPHILS NFR BLD AUTO: 68.6 % (ref 36–66)
NONHDLC SERPL-MCNC: 112 MG/DL
PLATELET # BLD AUTO: 343 10^3/UL (ref 150–450)
POTASSIUM SERPL-SCNC: 4.4 MEQ/L (ref 3.5–5.1)
PROT SERPL-MCNC: 7.2 GM/DL (ref 6.4–8.2)
RBC # BLD AUTO: 3.93 10^6/UL (ref 4–5.4)
SODIUM SERPL-SCNC: 142 MEQ/L (ref 136–145)
T4 FREE SERPL-MCNC: 1.2 NG/DL (ref 0.76–1.46)
TIBC SERPL-MCNC: 276 UG/DL (ref 250–450)
TRIGL SERPL-MCNC: 99 MG/DL (ref ?–150)
TSH SERPL DL<=0.005 MIU/L-ACNC: 1.19 UIU/ML (ref 0.36–3.74)
WBC # BLD AUTO: 9.4 10^3/UL (ref 4–10)

## 2022-06-27 ENCOUNTER — HOSPITAL ENCOUNTER (OUTPATIENT)
Dept: HOSPITAL 53 - M PLALAB | Age: 82
End: 2022-06-27
Attending: FAMILY MEDICINE
Payer: COMMERCIAL

## 2022-06-27 DIAGNOSIS — I10: Primary | ICD-10-CM

## 2022-06-27 DIAGNOSIS — D64.9: ICD-10-CM

## 2022-06-27 LAB
ALBUMIN SERPL BCG-MCNC: 4.2 GM/DL (ref 3.2–5.2)
ALT SERPL W P-5'-P-CCNC: 18 U/L (ref 12–78)
BASOPHILS # BLD AUTO: 0.1 10^3/UL (ref 0–0.2)
BASOPHILS NFR BLD AUTO: 0.7 % (ref 0–1)
BILIRUB SERPL-MCNC: 0.5 MG/DL (ref 0.2–1)
BUN SERPL-MCNC: 15 MG/DL (ref 7–18)
CALCIUM SERPL-MCNC: 9.4 MG/DL (ref 8.8–10.2)
CHLORIDE SERPL-SCNC: 103 MEQ/L (ref 98–107)
CHOLEST SERPL-MCNC: 154 MG/DL (ref ?–200)
CHOLEST/HDLC SERPL: 2.2 {RATIO} (ref ?–5)
CO2 SERPL-SCNC: 31 MEQ/L (ref 21–32)
CREAT SERPL-MCNC: 0.74 MG/DL (ref 0.55–1.3)
EOSINOPHIL # BLD AUTO: 0.4 10^3/UL (ref 0–0.5)
EOSINOPHIL NFR BLD AUTO: 4.2 % (ref 0–3)
FERRITIN SERPL-MCNC: 180 NG/ML (ref 8–252)
GFR SERPL CREATININE-BSD FRML MDRD: > 60 ML/MIN/{1.73_M2} (ref 32–?)
GLUCOSE SERPL-MCNC: 94 MG/DL (ref 70–100)
HCT VFR BLD AUTO: 39.5 % (ref 36–47)
HDLC SERPL-MCNC: 70 MG/DL (ref 40–?)
HGB BLD-MCNC: 12.3 G/DL (ref 12–15.5)
IRON SATN MFR SERPL: 39.9 % (ref 13.2–45)
IRON SERPL-MCNC: 107 UG/DL (ref 50–170)
LDLC SERPL CALC-MCNC: 66 MG/DL (ref ?–100)
LYMPHOCYTES # BLD AUTO: 2.2 10^3/UL (ref 1.5–5)
LYMPHOCYTES NFR BLD AUTO: 26.1 % (ref 24–44)
MCH RBC QN AUTO: 30.1 PG (ref 27–33)
MCHC RBC AUTO-ENTMCNC: 31.1 G/DL (ref 32–36.5)
MCV RBC AUTO: 96.6 FL (ref 80–96)
MONOCYTES # BLD AUTO: 0.6 10^3/UL (ref 0–0.8)
MONOCYTES NFR BLD AUTO: 7.3 % (ref 2–8)
NEUTROPHILS # BLD AUTO: 5.1 10^3/UL (ref 1.5–8.5)
NEUTROPHILS NFR BLD AUTO: 61.5 % (ref 36–66)
NONHDLC SERPL-MCNC: 84 MG/DL
PLATELET # BLD AUTO: 332 10^3/UL (ref 150–450)
POTASSIUM SERPL-SCNC: 4.5 MEQ/L (ref 3.5–5.1)
PROT SERPL-MCNC: 7.7 GM/DL (ref 6.4–8.2)
RBC # BLD AUTO: 4.09 10^6/UL (ref 4–5.4)
SODIUM SERPL-SCNC: 139 MEQ/L (ref 136–145)
T4 FREE SERPL-MCNC: 1.15 NG/DL (ref 0.76–1.46)
TIBC SERPL-MCNC: 268 UG/DL (ref 250–450)
TRIGL SERPL-MCNC: 92 MG/DL (ref ?–150)
TSH SERPL DL<=0.005 MIU/L-ACNC: 1.96 UIU/ML (ref 0.36–3.74)
WBC # BLD AUTO: 8.3 10^3/UL (ref 4–10)

## 2022-08-05 ENCOUNTER — HOSPITAL ENCOUNTER (EMERGENCY)
Dept: HOSPITAL 53 - M ED | Age: 82
Discharge: HOME | End: 2022-08-05
Payer: COMMERCIAL

## 2022-08-05 VITALS — HEIGHT: 59 IN | BODY MASS INDEX: 25.25 KG/M2 | WEIGHT: 125.27 LBS

## 2022-08-05 VITALS — DIASTOLIC BLOOD PRESSURE: 66 MMHG | SYSTOLIC BLOOD PRESSURE: 136 MMHG

## 2022-08-05 DIAGNOSIS — Z79.82: ICD-10-CM

## 2022-08-05 DIAGNOSIS — Z91.013: ICD-10-CM

## 2022-08-05 DIAGNOSIS — Z79.899: ICD-10-CM

## 2022-08-05 DIAGNOSIS — I10: ICD-10-CM

## 2022-08-05 DIAGNOSIS — T67.5XXA: ICD-10-CM

## 2022-08-05 DIAGNOSIS — R55: Primary | ICD-10-CM

## 2022-08-05 DIAGNOSIS — R94.31: ICD-10-CM

## 2022-08-05 DIAGNOSIS — Z88.8: ICD-10-CM

## 2022-08-05 DIAGNOSIS — E78.5: ICD-10-CM

## 2022-08-05 DIAGNOSIS — Z86.73: ICD-10-CM

## 2022-08-05 DIAGNOSIS — F33.9: ICD-10-CM

## 2022-08-05 LAB
BASOPHILS # BLD AUTO: 0.1 10^3/UL (ref 0–0.2)
BASOPHILS NFR BLD AUTO: 0.7 % (ref 0–1)
BUN SERPL-MCNC: 26 MG/DL (ref 7–18)
CALCIUM SERPL-MCNC: 9.1 MG/DL (ref 8.8–10.2)
CHLORIDE SERPL-SCNC: 104 MEQ/L (ref 98–107)
CK MB CFR.DF SERPL CALC: 3.24
CK MB CFR.DF SERPL CALC: 3.71
CK MB SERPL-MCNC: 1.2 NG/ML (ref ?–3.6)
CK MB SERPL-MCNC: 1.3 NG/ML (ref ?–3.6)
CK SERPL-CCNC: 35 U/L (ref 26–192)
CK SERPL-CCNC: 37 U/L (ref 26–192)
CO2 SERPL-SCNC: 31 MEQ/L (ref 21–32)
CREAT SERPL-MCNC: 1.04 MG/DL (ref 0.55–1.3)
EOSINOPHIL # BLD AUTO: 0.2 10^3/UL (ref 0–0.5)
EOSINOPHIL NFR BLD AUTO: 3.4 % (ref 0–3)
GFR SERPL CREATININE-BSD FRML MDRD: 53.9 ML/MIN/{1.73_M2} (ref 32–?)
GLUCOSE SERPL-MCNC: 167 MG/DL (ref 70–100)
HCT VFR BLD AUTO: 33.3 % (ref 36–47)
HGB BLD-MCNC: 10.6 G/DL (ref 12–15.5)
LYMPHOCYTES # BLD AUTO: 1.3 10^3/UL (ref 1.5–5)
LYMPHOCYTES NFR BLD AUTO: 17.6 % (ref 24–44)
MAGNESIUM SERPL-MCNC: 2.4 MG/DL (ref 1.8–2.4)
MCH RBC QN AUTO: 30.3 PG (ref 27–33)
MCHC RBC AUTO-ENTMCNC: 31.8 G/DL (ref 32–36.5)
MCV RBC AUTO: 95.1 FL (ref 80–96)
MONOCYTES # BLD AUTO: 0.5 10^3/UL (ref 0–0.8)
MONOCYTES NFR BLD AUTO: 7.4 % (ref 2–8)
NEUTROPHILS # BLD AUTO: 5.1 10^3/UL (ref 1.5–8.5)
NEUTROPHILS NFR BLD AUTO: 70.8 % (ref 36–66)
PLATELET # BLD AUTO: 257 10^3/UL (ref 150–450)
POTASSIUM SERPL-SCNC: 3.8 MEQ/L (ref 3.5–5.1)
RBC # BLD AUTO: 3.5 10^6/UL (ref 4–5.4)
SODIUM SERPL-SCNC: 140 MEQ/L (ref 136–145)
T4 FREE SERPL-MCNC: 0.96 NG/DL (ref 0.76–1.46)
TSH SERPL DL<=0.005 MIU/L-ACNC: 2.47 UIU/ML (ref 0.36–3.74)
WBC # BLD AUTO: 7.2 10^3/UL (ref 4–10)